# Patient Record
Sex: MALE | Race: BLACK OR AFRICAN AMERICAN | Employment: OTHER | URBAN - METROPOLITAN AREA
[De-identification: names, ages, dates, MRNs, and addresses within clinical notes are randomized per-mention and may not be internally consistent; named-entity substitution may affect disease eponyms.]

---

## 2023-07-07 ENCOUNTER — HOSPITAL ENCOUNTER (EMERGENCY)
Age: 63
Discharge: HOME | End: 2023-07-07
Payer: MEDICAID

## 2023-07-07 VITALS
HEART RATE: 97 BPM | TEMPERATURE: 97.1 F | WEIGHT: 265 LBS | HEIGHT: 73 IN | OXYGEN SATURATION: 99 % | SYSTOLIC BLOOD PRESSURE: 144 MMHG | BODY MASS INDEX: 35.12 KG/M2 | RESPIRATION RATE: 16 BRPM | DIASTOLIC BLOOD PRESSURE: 88 MMHG

## 2023-07-07 DIAGNOSIS — Z00.8 ENCOUNTER FOR MEDICAL CLEARANCE FOR PATIENT HOLD: Primary | ICD-10-CM

## 2023-07-07 PROCEDURE — 99282 EMERGENCY DEPT VISIT SF MDM: CPT

## 2023-07-07 ASSESSMENT — ENCOUNTER SYMPTOMS
DIARRHEA: 0
CHEST TIGHTNESS: 0
WHEEZING: 0
COUGH: 0
STRIDOR: 0
ABDOMINAL DISTENTION: 0
SHORTNESS OF BREATH: 0
VOMITING: 0
CHOKING: 0
RHINORRHEA: 0
ABDOMINAL PAIN: 0
NAUSEA: 0
APNEA: 0
SORE THROAT: 0

## 2023-07-07 ASSESSMENT — PAIN DESCRIPTION - ONSET: ONSET: ON-GOING

## 2023-07-07 ASSESSMENT — PAIN DESCRIPTION - PAIN TYPE: TYPE: ACUTE PAIN

## 2023-07-07 ASSESSMENT — PAIN - FUNCTIONAL ASSESSMENT: PAIN_FUNCTIONAL_ASSESSMENT: NONE - DENIES PAIN

## 2023-07-07 NOTE — ED TRIAGE NOTES
Pt states [facility] states he needs medical cleared to go into rehab. Pt is A&OX4, skin intact, afebrile, breaths are equal and unlabored.

## 2023-08-22 DIAGNOSIS — I50.9 CONGESTIVE HEART FAILURE, UNSPECIFIED HF CHRONICITY, UNSPECIFIED HEART FAILURE TYPE (HCC): Primary | ICD-10-CM

## 2023-08-25 ENCOUNTER — OFFICE VISIT (OUTPATIENT)
Dept: SURGERY | Age: 63
End: 2023-08-25

## 2023-08-25 VITALS
TEMPERATURE: 97.7 F | OXYGEN SATURATION: 97 % | HEART RATE: 70 BPM | WEIGHT: 261.4 LBS | SYSTOLIC BLOOD PRESSURE: 132 MMHG | HEIGHT: 73 IN | BODY MASS INDEX: 34.64 KG/M2 | DIASTOLIC BLOOD PRESSURE: 68 MMHG

## 2023-08-25 DIAGNOSIS — L73.2 HIDRADENITIS SUPPURATIVA OF ANUS: Primary | ICD-10-CM

## 2023-08-25 RX ORDER — MAGNESIUM OXIDE 400 MG/1
1 TABLET ORAL DAILY
COMMUNITY
Start: 2023-08-04

## 2023-08-25 RX ORDER — POTASSIUM CHLORIDE 1500 MG/1
20 TABLET, EXTENDED RELEASE ORAL DAILY
COMMUNITY
Start: 2023-08-04

## 2023-08-25 RX ORDER — MULTIVITAMIN
1 CAPSULE ORAL
COMMUNITY

## 2023-08-25 RX ORDER — SULFAMETHOXAZOLE AND TRIMETHOPRIM 800; 160 MG/1; MG/1
1 TABLET ORAL EVERY 12 HOURS
COMMUNITY
Start: 2023-08-17

## 2023-08-25 RX ORDER — SPIRONOLACTONE 25 MG/1
25 TABLET ORAL DAILY
COMMUNITY
Start: 2023-08-04

## 2023-08-25 RX ORDER — SACUBITRIL AND VALSARTAN 49; 51 MG/1; MG/1
TABLET, FILM COATED ORAL
COMMUNITY
Start: 2023-08-25

## 2023-08-25 RX ORDER — PSEUDOEPHEDRINE HCL 30 MG
100 TABLET ORAL
COMMUNITY
Start: 2019-08-06

## 2023-08-25 RX ORDER — METOPROLOL SUCCINATE 50 MG/1
50 TABLET, EXTENDED RELEASE ORAL DAILY
COMMUNITY
Start: 2023-08-04

## 2023-08-25 RX ORDER — BUMETANIDE 2 MG/1
2 TABLET ORAL 2 TIMES DAILY
COMMUNITY
Start: 2023-08-04

## 2023-08-25 RX ORDER — IBUPROFEN 600 MG/1
600 TABLET ORAL
COMMUNITY
Start: 2019-07-09

## 2023-08-31 ENCOUNTER — OFFICE VISIT (OUTPATIENT)
Dept: CARDIOLOGY CLINIC | Age: 63
End: 2023-08-31
Payer: MEDICAID

## 2023-08-31 VITALS
HEART RATE: 75 BPM | BODY MASS INDEX: 34.27 KG/M2 | WEIGHT: 258.6 LBS | OXYGEN SATURATION: 98 % | HEIGHT: 73 IN | SYSTOLIC BLOOD PRESSURE: 128 MMHG | DIASTOLIC BLOOD PRESSURE: 72 MMHG

## 2023-08-31 DIAGNOSIS — I50.43 CHF (CONGESTIVE HEART FAILURE), NYHA CLASS I, ACUTE ON CHRONIC, COMBINED (HCC): ICD-10-CM

## 2023-08-31 DIAGNOSIS — I10 HYPERTENSION, UNSPECIFIED TYPE: ICD-10-CM

## 2023-08-31 DIAGNOSIS — Z00.00 PE (PHYSICAL EXAM), ANNUAL: Primary | ICD-10-CM

## 2023-08-31 PROCEDURE — 99204 OFFICE O/P NEW MOD 45 MIN: CPT | Performed by: INTERNAL MEDICINE

## 2023-08-31 PROCEDURE — 3078F DIAST BP <80 MM HG: CPT | Performed by: INTERNAL MEDICINE

## 2023-08-31 PROCEDURE — 93000 ELECTROCARDIOGRAM COMPLETE: CPT | Performed by: INTERNAL MEDICINE

## 2023-08-31 PROCEDURE — 3074F SYST BP LT 130 MM HG: CPT | Performed by: INTERNAL MEDICINE

## 2023-08-31 ASSESSMENT — ENCOUNTER SYMPTOMS
GASTROINTESTINAL NEGATIVE: 1
CHEST TIGHTNESS: 0
EYES NEGATIVE: 1
WHEEZING: 0
SHORTNESS OF BREATH: 0
RESPIRATORY NEGATIVE: 1
COUGH: 0
BLOOD IN STOOL: 0
NAUSEA: 0
STRIDOR: 0

## 2023-08-31 NOTE — PROGRESS NOTES
NEW PATIENT        Patient: Molly Avila  YOB: 1960  MRN: [id number]    Chief Complaint:  CHF  Chief Complaint   Patient presents with    New Patient     Referred by Mikey Villaseñor CNP for CHF       CV Data:  11/22 Echo - [facility] LV Dilated REF 10-15    Subjective/HPI  referred for CHF. Pt moved from [place], [place]. Pt apparently has reduced LVEF and has had CHF. He is on appropriate HF meds at this time he is compensated with no sob nor cp. 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 No past medical history on file. No past surgical history on file. No family history on file. Social History     Socioeconomic History    Marital status:      Spouse name: None    Number of children: None    Years of education: None    Highest education level: None   Tobacco Use    Smoking status: Every Day     Packs/day: 0.50     Types: Cigarettes    Smokeless tobacco: Never   Substance and Sexual Activity    Alcohol use: Not Currently    Drug use: Never       Allergies   Allergen Reactions    Monosodium Glutamate Other (See Comments)     Pt experiences boils. boils and skin irritations         Current Outpatient Medications   Medication Sig Dispense Refill    sulfamethoxazole-trimethoprim (BACTRIM DS;SEPTRA DS) 800-160 MG per tablet Take 1 tablet by mouth in the morning and 1 tablet in the evening.       bumetanide (BUMEX) 2 MG tablet Take 1 tablet by mouth 2 times daily      ibuprofen (ADVIL;MOTRIN) 600 MG tablet Take 1 tablet by mouth      magnesium oxide (MAG-OX) 400 MG tablet Take 1 tablet by mouth daily      metoprolol succinate (TOPROL XL) 50 MG extended release tablet Take 1 tablet by mouth daily      potassium chloride (KLOR-CON M) 20 MEQ TBCR extended release tablet Take 1 tablet by mouth daily      sacubitril-valsartan (ENTRESTO) 49-51 MG per tablet       spironolactone (ALDACTONE) 25 MG tablet Take 1 tablet by mouth

## 2023-11-16 ENCOUNTER — HOSPITAL ENCOUNTER (OUTPATIENT)
Age: 63
Discharge: HOME | End: 2023-11-18
Attending: INTERNAL MEDICINE
Payer: COMMERCIAL

## 2023-11-16 VITALS
BODY MASS INDEX: 34.27 KG/M2 | WEIGHT: 258.6 LBS | HEIGHT: 73 IN | DIASTOLIC BLOOD PRESSURE: 72 MMHG | SYSTOLIC BLOOD PRESSURE: 128 MMHG

## 2023-11-16 DIAGNOSIS — I50.43 CHF (CONGESTIVE HEART FAILURE), NYHA CLASS I, ACUTE ON CHRONIC, COMBINED (HCC): ICD-10-CM

## 2023-11-16 LAB
ECHO AO ROOT DIAM: 3.3 CM
ECHO AO ROOT INDEX: 1.38 CM/M2
ECHO AR MAX VEL PISA: 3.9 M/S
ECHO AV AREA PEAK VELOCITY: 1.9 CM2
ECHO AV AREA VTI: 2 CM2
ECHO AV AREA/BSA PEAK VELOCITY: 0.8 CM2/M2
ECHO AV AREA/BSA VTI: 0.8 CM2/M2
ECHO AV MEAN GRADIENT: 6 MMHG
ECHO AV MEAN VELOCITY: 1.2 M/S
ECHO AV PEAK GRADIENT: 12 MMHG
ECHO AV PEAK VELOCITY: 1.8 M/S
ECHO AV REGURGITANT PHT: 372.8 MILLISECOND
ECHO AV VELOCITY RATIO: 0.61
ECHO AV VTI: 38.1 CM
ECHO BSA: 2.46 M2
ECHO EST RA PRESSURE: 10 MMHG
ECHO LA VOL A-L A2C: 97 ML (ref 18–58)
ECHO LA VOL A-L A4C: 86 ML (ref 18–58)
ECHO LA VOL MOD A2C: 89 ML (ref 18–58)
ECHO LA VOL MOD A4C: 81 ML (ref 18–58)
ECHO LA VOLUME AREA LENGTH: 93 ML
ECHO LA VOLUME INDEX A-L A2C: 40 ML/M2 (ref 16–34)
ECHO LA VOLUME INDEX A-L A4C: 36 ML/M2 (ref 16–34)
ECHO LA VOLUME INDEX AREA LENGTH: 39 ML/M2 (ref 16–34)
ECHO LA VOLUME INDEX MOD A2C: 37 ML/M2 (ref 16–34)
ECHO LA VOLUME INDEX MOD A4C: 34 ML/M2 (ref 16–34)
ECHO LV E' LATERAL VELOCITY: 12 CM/S
ECHO LV E' SEPTAL VELOCITY: 7 CM/S
ECHO LV EDV A2C: 181 ML
ECHO LV EDV A4C: 205 ML
ECHO LV EDV BP: 167 ML (ref 67–155)
ECHO LV EDV INDEX A4C: 85 ML/M2
ECHO LV EDV INDEX BP: 70 ML/M2
ECHO LV EDV NDEX A2C: 75 ML/M2
ECHO LV EJECTION FRACTION A2C: 49 %
ECHO LV EJECTION FRACTION A4C: 36 %
ECHO LV EJECTION FRACTION BIPLANE: 36 % (ref 55–100)
ECHO LV ESV A2C: 93 ML
ECHO LV ESV A4C: 132 ML
ECHO LV ESV BP: 107 ML (ref 22–58)
ECHO LV ESV INDEX A2C: 39 ML/M2
ECHO LV ESV INDEX A4C: 55 ML/M2
ECHO LV ESV INDEX BP: 45 ML/M2
ECHO LVOT AREA: 3.1 CM2
ECHO LVOT AV VTI INDEX: 0.65
ECHO LVOT DIAM: 2 CM
ECHO LVOT MEAN GRADIENT: 3 MMHG
ECHO LVOT PEAK GRADIENT: 4 MMHG
ECHO LVOT PEAK VELOCITY: 1.1 M/S
ECHO LVOT STROKE VOLUME INDEX: 32.6 ML/M2
ECHO LVOT SV: 78.2 ML
ECHO LVOT VTI: 24.9 CM
ECHO MV A VELOCITY: 0.49 M/S
ECHO MV AREA VTI: 2.3 CM2
ECHO MV E DECELERATION TIME (DT): 184.7 MS
ECHO MV E VELOCITY: 0.72 M/S
ECHO MV E/A RATIO: 1.47
ECHO MV E/E' LATERAL: 6
ECHO MV E/E' RATIO (AVERAGED): 8.14
ECHO MV LVOT VTI INDEX: 1.38
ECHO MV MAX VELOCITY: 1.1 M/S
ECHO MV MEAN GRADIENT: 2 MMHG
ECHO MV MEAN VELOCITY: 0.7 M/S
ECHO MV PEAK GRADIENT: 4 MMHG
ECHO MV REGURGITANT PEAK GRADIENT: 100 MMHG
ECHO MV REGURGITANT PEAK VELOCITY: 5 M/S
ECHO MV VTI: 34.3 CM
ECHO RIGHT VENTRICULAR SYSTOLIC PRESSURE (RVSP): 74 MMHG
ECHO TV REGURGITANT MAX VELOCITY: 4.01 M/S
ECHO TV REGURGITANT PEAK GRADIENT: 64 MMHG

## 2023-11-16 PROCEDURE — 6360000004 HC RX CONTRAST MEDICATION: Performed by: INTERNAL MEDICINE

## 2023-11-16 PROCEDURE — C8929 TTE W OR WO FOL WCON,DOPPLER: HCPCS

## 2023-11-16 RX ADMIN — PERFLUTREN 1.5 ML: 6.52 INJECTION, SUSPENSION INTRAVENOUS at 15:15

## 2023-11-22 ENCOUNTER — PREP FOR PROCEDURE (OUTPATIENT)
Dept: CARDIOLOGY CLINIC | Age: 63
End: 2023-11-22

## 2023-11-22 ENCOUNTER — OFFICE VISIT (OUTPATIENT)
Dept: CARDIOLOGY CLINIC | Age: 63
End: 2023-11-22
Payer: MEDICAID

## 2023-11-22 VITALS
DIASTOLIC BLOOD PRESSURE: 70 MMHG | WEIGHT: 266 LBS | HEART RATE: 72 BPM | SYSTOLIC BLOOD PRESSURE: 128 MMHG | OXYGEN SATURATION: 97 % | BODY MASS INDEX: 35.25 KG/M2 | HEIGHT: 73 IN

## 2023-11-22 DIAGNOSIS — R06.09 DOE (DYSPNEA ON EXERTION): ICD-10-CM

## 2023-11-22 DIAGNOSIS — I42.9 CARDIOMYOPATHY, UNSPECIFIED TYPE (HCC): Primary | ICD-10-CM

## 2023-11-22 DIAGNOSIS — I35.1 NONRHEUMATIC AORTIC VALVE INSUFFICIENCY: ICD-10-CM

## 2023-11-22 DIAGNOSIS — I10 HYPERTENSION, UNSPECIFIED TYPE: ICD-10-CM

## 2023-11-22 LAB
ANION GAP SERPL CALCULATED.3IONS-SCNC: 11 MEQ/L (ref 9–15)
BUN SERPL-MCNC: 12 MG/DL (ref 8–23)
CALCIUM SERPL-MCNC: 9.3 MG/DL (ref 8.5–9.9)
CHLORIDE SERPL-SCNC: 106 MEQ/L (ref 95–107)
CO2 SERPL-SCNC: 24 MEQ/L (ref 20–31)
CREAT SERPL-MCNC: 0.87 MG/DL (ref 0.7–1.2)
ERYTHROCYTE [DISTWIDTH] IN BLOOD BY AUTOMATED COUNT: 13.7 % (ref 11.5–14.5)
GLUCOSE SERPL-MCNC: 117 MG/DL (ref 70–99)
HCT VFR BLD AUTO: 44.8 % (ref 42–52)
HGB BLD-MCNC: 14.1 G/DL (ref 14–18)
MCH RBC QN AUTO: 26.8 PG (ref 27–31.3)
MCHC RBC AUTO-ENTMCNC: 31.5 % (ref 33–37)
MCV RBC AUTO: 85 FL (ref 79–92.2)
PLATELET # BLD AUTO: 246 K/UL (ref 130–400)
POTASSIUM SERPL-SCNC: 3.7 MEQ/L (ref 3.4–4.9)
RBC # BLD AUTO: 5.27 M/UL (ref 4.7–6.1)
SODIUM SERPL-SCNC: 141 MEQ/L (ref 135–144)
WBC # BLD AUTO: 6.6 K/UL (ref 4.8–10.8)

## 2023-11-22 PROCEDURE — 4004F PT TOBACCO SCREEN RCVD TLK: CPT | Performed by: INTERNAL MEDICINE

## 2023-11-22 PROCEDURE — 3017F COLORECTAL CA SCREEN DOC REV: CPT | Performed by: INTERNAL MEDICINE

## 2023-11-22 PROCEDURE — G8427 DOCREV CUR MEDS BY ELIG CLIN: HCPCS | Performed by: INTERNAL MEDICINE

## 2023-11-22 PROCEDURE — G8484 FLU IMMUNIZE NO ADMIN: HCPCS | Performed by: INTERNAL MEDICINE

## 2023-11-22 PROCEDURE — 3078F DIAST BP <80 MM HG: CPT | Performed by: INTERNAL MEDICINE

## 2023-11-22 PROCEDURE — G8417 CALC BMI ABV UP PARAM F/U: HCPCS | Performed by: INTERNAL MEDICINE

## 2023-11-22 PROCEDURE — 99215 OFFICE O/P EST HI 40 MIN: CPT | Performed by: INTERNAL MEDICINE

## 2023-11-22 PROCEDURE — 3074F SYST BP LT 130 MM HG: CPT | Performed by: INTERNAL MEDICINE

## 2023-11-22 RX ORDER — SODIUM CHLORIDE 9 MG/ML
INJECTION, SOLUTION INTRAVENOUS PRN
Status: CANCELLED | OUTPATIENT
Start: 2023-11-22

## 2023-11-22 RX ORDER — ONDANSETRON 2 MG/ML
4 INJECTION INTRAMUSCULAR; INTRAVENOUS EVERY 6 HOURS PRN
Status: CANCELLED | OUTPATIENT
Start: 2023-11-22

## 2023-11-22 RX ORDER — NITROGLYCERIN 0.4 MG/1
0.4 TABLET SUBLINGUAL EVERY 5 MIN PRN
Status: CANCELLED | OUTPATIENT
Start: 2023-11-22

## 2023-11-22 RX ORDER — SODIUM CHLORIDE 450 MG/100ML
75 INJECTION, SOLUTION INTRAVENOUS CONTINUOUS
Status: CANCELLED | OUTPATIENT
Start: 2023-11-22

## 2023-11-22 RX ORDER — SODIUM CHLORIDE 0.9 % (FLUSH) 0.9 %
5-40 SYRINGE (ML) INJECTION PRN
Status: CANCELLED | OUTPATIENT
Start: 2023-11-22

## 2023-11-22 RX ORDER — DIPHENHYDRAMINE HYDROCHLORIDE 50 MG/ML
50 INJECTION INTRAMUSCULAR; INTRAVENOUS ONCE
Status: CANCELLED | OUTPATIENT
Start: 2023-11-22 | End: 2023-11-22

## 2023-11-22 RX ORDER — SODIUM CHLORIDE 0.9 % (FLUSH) 0.9 %
5-40 SYRINGE (ML) INJECTION EVERY 12 HOURS SCHEDULED
Status: CANCELLED | OUTPATIENT
Start: 2023-11-22

## 2023-11-22 ASSESSMENT — ENCOUNTER SYMPTOMS
SHORTNESS OF BREATH: 0
GASTROINTESTINAL NEGATIVE: 1
BLOOD IN STOOL: 0
WHEEZING: 0
CHEST TIGHTNESS: 0
COUGH: 0
STRIDOR: 0
EYES NEGATIVE: 1
RESPIRATORY NEGATIVE: 1
NAUSEA: 0

## 2023-11-22 NOTE — PROGRESS NOTES
OFFICE VISIT         Patient: Douglas Osullivan  YOB: 1960  MRN: [id number]    Chief Complaint:  CHF  Chief Complaint   Patient presents with    Results     ECHO       CV Data:  11/22 Echo - [facility] LV Dilated REF 10-15  11/23 Echo 25-30 RVSP 74 AR mild Mod     Subjective/HPI  referred for CHF. Pt moved from [place], [place]. Pt apparently has reduced LVEF and has had CHF. He is on appropriate HF meds at this time he is compensated with no sob nor cp. No edema     No cath    11/22/23 some estrada no cp no falls no bleed takes meds. 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 No past medical history on file. Past Surgical History:   Procedure Laterality Date    XR MIDLINE EQUAL OR GREATER THAN 5 YEARS  6/6/2014    XR MIDLINE EQUAL OR GREATER THAN 5 YEARS 6/6/2014    XR MIDLINE EQUAL OR GREATER THAN 5 YEARS  8/27/2013    XR MIDLINE EQUAL OR GREATER THAN 5 YEARS 8/27/2013       No family history on file. Social History     Socioeconomic History    Marital status:      Spouse name: None    Number of children: None    Years of education: None    Highest education level: None   Tobacco Use    Smoking status: Every Day     Packs/day: .5     Types: Cigarettes    Smokeless tobacco: Never   Substance and Sexual Activity    Alcohol use: Not Currently    Drug use: Never       Allergies   Allergen Reactions    Monosodium Glutamate Other (See Comments)     Pt experiences boils. boils and skin irritations         Current Outpatient Medications   Medication Sig Dispense Refill    sulfamethoxazole-trimethoprim (BACTRIM DS;SEPTRA DS) 800-160 MG per tablet Take 1 tablet by mouth in the morning and 1 tablet in the evening.       bumetanide (BUMEX) 2 MG tablet Take 1 tablet by mouth 2 times daily      ibuprofen (ADVIL;MOTRIN) 600 MG tablet Take 1 tablet by mouth      magnesium oxide (MAG-OX) 400 MG tablet Take 1 tablet by mouth daily      metoprolol succinate

## 2023-12-01 DIAGNOSIS — I42.9 CARDIOMYOPATHY, UNSPECIFIED TYPE (HCC): Primary | ICD-10-CM

## 2023-12-08 ENCOUNTER — HOSPITAL ENCOUNTER (OUTPATIENT)
Age: 63
Setting detail: OUTPATIENT SURGERY
Discharge: HOME | End: 2023-12-08
Attending: INTERNAL MEDICINE | Admitting: INTERNAL MEDICINE
Payer: COMMERCIAL

## 2023-12-08 ENCOUNTER — TELEPHONE (OUTPATIENT)
Dept: CARDIOLOGY CLINIC | Age: 63
End: 2023-12-08

## 2023-12-08 VITALS
RESPIRATION RATE: 28 BRPM | BODY MASS INDEX: 34.3 KG/M2 | WEIGHT: 260 LBS | OXYGEN SATURATION: 93 % | TEMPERATURE: 97.1 F | HEART RATE: 77 BPM | DIASTOLIC BLOOD PRESSURE: 77 MMHG | SYSTOLIC BLOOD PRESSURE: 127 MMHG

## 2023-12-08 DIAGNOSIS — I42.9 CARDIOMYOPATHY (HCC): ICD-10-CM

## 2023-12-08 PROCEDURE — 93458 L HRT ARTERY/VENTRICLE ANGIO: CPT | Performed by: INTERNAL MEDICINE

## 2023-12-08 PROCEDURE — C1887 CATHETER, GUIDING: HCPCS | Performed by: INTERNAL MEDICINE

## 2023-12-08 PROCEDURE — C1725 CATH, TRANSLUMIN NON-LASER: HCPCS | Performed by: INTERNAL MEDICINE

## 2023-12-08 PROCEDURE — 99153 MOD SED SAME PHYS/QHP EA: CPT | Performed by: INTERNAL MEDICINE

## 2023-12-08 PROCEDURE — 93571 IV DOP VEL&/PRESS C FLO 1ST: CPT | Performed by: INTERNAL MEDICINE

## 2023-12-08 PROCEDURE — 2500000003 HC RX 250 WO HCPCS: Performed by: INTERNAL MEDICINE

## 2023-12-08 PROCEDURE — 2580000003 HC RX 258: Performed by: INTERNAL MEDICINE

## 2023-12-08 PROCEDURE — 85347 COAGULATION TIME ACTIVATED: CPT

## 2023-12-08 PROCEDURE — 6360000004 HC RX CONTRAST MEDICATION: Performed by: INTERNAL MEDICINE

## 2023-12-08 PROCEDURE — 99152 MOD SED SAME PHYS/QHP 5/>YRS: CPT | Performed by: INTERNAL MEDICINE

## 2023-12-08 PROCEDURE — C1874 STENT, COATED/COV W/DEL SYS: HCPCS | Performed by: INTERNAL MEDICINE

## 2023-12-08 PROCEDURE — C1769 GUIDE WIRE: HCPCS | Performed by: INTERNAL MEDICINE

## 2023-12-08 PROCEDURE — C1894 INTRO/SHEATH, NON-LASER: HCPCS | Performed by: INTERNAL MEDICINE

## 2023-12-08 PROCEDURE — 7100000011 HC PHASE II RECOVERY - ADDTL 15 MIN: Performed by: INTERNAL MEDICINE

## 2023-12-08 PROCEDURE — 6370000000 HC RX 637 (ALT 250 FOR IP): Performed by: INTERNAL MEDICINE

## 2023-12-08 PROCEDURE — 6360000002 HC RX W HCPCS: Performed by: INTERNAL MEDICINE

## 2023-12-08 PROCEDURE — 2709999900 HC NON-CHARGEABLE SUPPLY: Performed by: INTERNAL MEDICINE

## 2023-12-08 PROCEDURE — C9600 PERC DRUG-EL COR STENT SING: HCPCS | Performed by: INTERNAL MEDICINE

## 2023-12-08 PROCEDURE — 7100000010 HC PHASE II RECOVERY - FIRST 15 MIN: Performed by: INTERNAL MEDICINE

## 2023-12-08 DEVICE — STENT ONYXNG30022UX ONYX 3.00X22RX
Type: IMPLANTABLE DEVICE | Status: FUNCTIONAL
Brand: ONYX FRONTIER™

## 2023-12-08 RX ORDER — ONDANSETRON 2 MG/ML
4 INJECTION INTRAMUSCULAR; INTRAVENOUS EVERY 6 HOURS PRN
Status: DISCONTINUED | OUTPATIENT
Start: 2023-12-08 | End: 2023-12-08 | Stop reason: HOSPADM

## 2023-12-08 RX ORDER — HEPARIN SODIUM 200 [USP'U]/100ML
INJECTION, SOLUTION INTRAVENOUS CONTINUOUS PRN
Status: COMPLETED | OUTPATIENT
Start: 2023-12-08 | End: 2023-12-08

## 2023-12-08 RX ORDER — FENTANYL CITRATE 50 UG/ML
INJECTION, SOLUTION INTRAMUSCULAR; INTRAVENOUS PRN
Status: DISCONTINUED | OUTPATIENT
Start: 2023-12-08 | End: 2023-12-08 | Stop reason: HOSPADM

## 2023-12-08 RX ORDER — SODIUM CHLORIDE 0.9 % (FLUSH) 0.9 %
5-40 SYRINGE (ML) INJECTION PRN
Status: DISCONTINUED | OUTPATIENT
Start: 2023-12-08 | End: 2023-12-08 | Stop reason: HOSPADM

## 2023-12-08 RX ORDER — ATORVASTATIN CALCIUM 80 MG/1
80 TABLET, FILM COATED ORAL DAILY
Qty: 90 TABLET | Refills: 3 | Status: SHIPPED | OUTPATIENT
Start: 2023-12-08

## 2023-12-08 RX ORDER — SODIUM CHLORIDE 9 MG/ML
INJECTION, SOLUTION INTRAVENOUS PRN
Status: DISCONTINUED | OUTPATIENT
Start: 2023-12-08 | End: 2023-12-08 | Stop reason: HOSPADM

## 2023-12-08 RX ORDER — SODIUM CHLORIDE 450 MG/100ML
75 INJECTION, SOLUTION INTRAVENOUS CONTINUOUS
Status: DISCONTINUED | OUTPATIENT
Start: 2023-12-08 | End: 2023-12-08 | Stop reason: HOSPADM

## 2023-12-08 RX ORDER — MIDAZOLAM HYDROCHLORIDE 1 MG/ML
INJECTION INTRAMUSCULAR; INTRAVENOUS PRN
Status: DISCONTINUED | OUTPATIENT
Start: 2023-12-08 | End: 2023-12-08 | Stop reason: HOSPADM

## 2023-12-08 RX ORDER — LIDOCAINE HYDROCHLORIDE 10 MG/ML
INJECTION, SOLUTION INFILTRATION; PERINEURAL PRN
Status: DISCONTINUED | OUTPATIENT
Start: 2023-12-08 | End: 2023-12-08 | Stop reason: HOSPADM

## 2023-12-08 RX ORDER — NITROGLYCERIN 20 MG/100ML
INJECTION INTRAVENOUS CONTINUOUS PRN
Status: COMPLETED | OUTPATIENT
Start: 2023-12-08 | End: 2023-12-08

## 2023-12-08 RX ORDER — SODIUM CHLORIDE 0.9 % (FLUSH) 0.9 %
5-40 SYRINGE (ML) INJECTION EVERY 12 HOURS SCHEDULED
Status: DISCONTINUED | OUTPATIENT
Start: 2023-12-08 | End: 2023-12-08 | Stop reason: HOSPADM

## 2023-12-08 RX ORDER — DIPHENHYDRAMINE HYDROCHLORIDE 50 MG/ML
50 INJECTION INTRAMUSCULAR; INTRAVENOUS ONCE
Status: DISCONTINUED | OUTPATIENT
Start: 2023-12-08 | End: 2023-12-08 | Stop reason: HOSPADM

## 2023-12-08 RX ORDER — CLOPIDOGREL 300 MG/1
600 TABLET, FILM COATED ORAL ONCE
Status: COMPLETED | OUTPATIENT
Start: 2023-12-08 | End: 2023-12-08

## 2023-12-08 RX ORDER — SODIUM CHLORIDE 9 MG/ML
INJECTION, SOLUTION INTRAVENOUS CONTINUOUS
Status: DISCONTINUED | OUTPATIENT
Start: 2023-12-08 | End: 2023-12-08 | Stop reason: HOSPADM

## 2023-12-08 RX ORDER — ASPIRIN 81 MG/1
81 TABLET ORAL DAILY
Qty: 90 TABLET | Refills: 1 | Status: SHIPPED | OUTPATIENT
Start: 2023-12-08

## 2023-12-08 RX ORDER — MIDAZOLAM HYDROCHLORIDE 2 MG/2ML
2 INJECTION, SOLUTION INTRAMUSCULAR; INTRAVENOUS
Status: DISCONTINUED | OUTPATIENT
Start: 2023-12-08 | End: 2023-12-08 | Stop reason: HOSPADM

## 2023-12-08 RX ORDER — CLOPIDOGREL BISULFATE 75 MG/1
75 TABLET ORAL DAILY
Qty: 30 TABLET | Refills: 3 | Status: SHIPPED | OUTPATIENT
Start: 2023-12-08

## 2023-12-08 RX ORDER — NITROGLYCERIN 0.4 MG/1
0.4 TABLET SUBLINGUAL EVERY 5 MIN PRN
Status: DISCONTINUED | OUTPATIENT
Start: 2023-12-08 | End: 2023-12-08 | Stop reason: HOSPADM

## 2023-12-08 RX ORDER — ACETAMINOPHEN 325 MG/1
650 TABLET ORAL EVERY 4 HOURS PRN
Status: DISCONTINUED | OUTPATIENT
Start: 2023-12-08 | End: 2023-12-08 | Stop reason: HOSPADM

## 2023-12-08 RX ORDER — FENTANYL CITRATE 0.05 MG/ML
25 INJECTION, SOLUTION INTRAMUSCULAR; INTRAVENOUS
Status: DISCONTINUED | OUTPATIENT
Start: 2023-12-08 | End: 2023-12-08 | Stop reason: HOSPADM

## 2023-12-08 RX ORDER — LABETALOL HYDROCHLORIDE 5 MG/ML
10 INJECTION, SOLUTION INTRAVENOUS EVERY 30 MIN PRN
Status: DISCONTINUED | OUTPATIENT
Start: 2023-12-08 | End: 2023-12-08 | Stop reason: HOSPADM

## 2023-12-08 RX ORDER — NITROGLYCERIN 0.4 MG/1
0.4 TABLET SUBLINGUAL EVERY 5 MIN PRN
Qty: 25 TABLET | Refills: 3 | Status: SHIPPED | OUTPATIENT
Start: 2023-12-08

## 2023-12-08 RX ORDER — SODIUM CHLORIDE 450 MG/100ML
INJECTION, SOLUTION INTRAVENOUS CONTINUOUS
Status: DISCONTINUED | OUTPATIENT
Start: 2023-12-08 | End: 2023-12-08 | Stop reason: HOSPADM

## 2023-12-08 RX ORDER — ASPIRIN 325 MG
325 TABLET, DELAYED RELEASE (ENTERIC COATED) ORAL DAILY
Status: DISCONTINUED | OUTPATIENT
Start: 2023-12-08 | End: 2023-12-08 | Stop reason: HOSPADM

## 2023-12-08 RX ORDER — HYDRALAZINE HYDROCHLORIDE 20 MG/ML
10 INJECTION INTRAMUSCULAR; INTRAVENOUS EVERY 10 MIN PRN
Status: DISCONTINUED | OUTPATIENT
Start: 2023-12-08 | End: 2023-12-08 | Stop reason: HOSPADM

## 2023-12-08 RX ORDER — MORPHINE SULFATE 2 MG/ML
2 INJECTION, SOLUTION INTRAMUSCULAR; INTRAVENOUS
Status: DISCONTINUED | OUTPATIENT
Start: 2023-12-08 | End: 2023-12-08 | Stop reason: HOSPADM

## 2023-12-08 RX ORDER — HEPARIN SODIUM 1000 [USP'U]/ML
INJECTION, SOLUTION INTRAVENOUS; SUBCUTANEOUS PRN
Status: DISCONTINUED | OUTPATIENT
Start: 2023-12-08 | End: 2023-12-08 | Stop reason: HOSPADM

## 2023-12-08 RX ADMIN — CLOPIDOGREL BISULFATE 600 MG: 300 TABLET, FILM COATED ORAL at 10:17

## 2023-12-08 RX ADMIN — ASPIRIN 325 MG: 325 TABLET, COATED ORAL at 10:17

## 2023-12-08 NOTE — BRIEF OP NOTE
Section of Cardiology  Adult Brief Cardiac Cath Procedure Note        Procedure(s):    IFR of LAD (+0.76)  Successful PCI of the mid LAD KIMBERLEY x 1    Pre-operative Diagnosis:    Cardiomyopathy    H&P Status: Completed and reviewed.      Post-operative Diagnosis: Triple-vessel CAD    Findings:  See full report    Vitamin system  Left Main: Short mild disease  LAD: Mid segment 70% stenosis IFR positive status post successful PCI KIMBERLEY x 1  Circumflex: OM1 distal 80% stenosis (left untreated, will be staged)  RCA: Brick size dominant vessel proximal 80% stenosis mid 80% stenosis (left untreated will be staged)        Complications:  none    Recommendations  Plavix ideally for 1 year  Aspirin and atorvastatin indefinitely  Plan to bring patient back in 2 weeks for staged PCI of the RCA    Primary Proceduralist:   Chaya Alexis MD    Full procedure note to follow

## 2023-12-08 NOTE — TELEPHONE ENCOUNTER
----- Message from Yousuf Chavez MD sent at 12/8/2023  9:49 AM EST -----  Please arrange staged PCI of the RCA with me in 2 weeks either Tuesday or Thursday or Friday at 8am

## 2023-12-08 NOTE — PROGRESS NOTES
Arrived to pre/post from home. Name and date of birth verified along with allergies. Orders verified and consents obtained.   Oriented to surroundings

## 2023-12-08 NOTE — BRIEF OP NOTE
Brief Postoperative Note      Patient: Lenin Rowe  YOB: 1960  MRN: [id number]  Section of Cardiology  Adult Brief Cardiac Cath Procedure Note        Procedure(s):  LHC, b/l coronary angio via RRA    Pre-operative Diagnosis:  ALEJO and CMP    H&P Status: Completed and reviewed. Post-operative Diagnosis:  LVEF 30% EDP 14 LAD mid 70%.  CX OMB distal 80% RCA sequential Px and Mid 80%    Findings:  See full report    Complications:  none    Primary Proceduralist:   Yamilet Garduno MD

## 2023-12-08 NOTE — PROGRESS NOTES
Arrived to pre/post from the cath lab and report from [facility]. Right radial vasc band to right wrist with no bleeding or hematoma. Attached to monitor and vitals are stable. Taking food and fluids. No complaints at this time.   Will continue to monitor

## 2023-12-08 NOTE — PROGRESS NOTES
5 cc's of air removed from right radial vasc band intact with no bleeding or hematoma.  Ambulated to the restroom without difficulty

## 2023-12-08 NOTE — DISCHARGE INSTRUCTIONS
May shower and remove right wrist dressing in the morning  No driving for 24 hours  No heavy lifting anything over 5 pounds for 2 days  Any redness, drainage, increased swelling or fever please call Dr. Balaji Diamond office  Any bleeding apply pressure and if unable to control the bleeding call 911 or go to the nearest ER  Resume medications including new prescriptions that were sent to your pharmacy  Follow up in 1 week with Dr. Annetta Bird, please call for appointment  with Dr. Annetta Bird as Dr. Miguel Burnett plans to bring you back in 2 weeks for staged stenting

## 2023-12-08 NOTE — PROGRESS NOTES
All remaining air removed from right radial and quikclot applied. No bleeding or hematoma. Discharge instructions given and patient verbalizes understanding.   IV's dc'd intact

## 2023-12-11 LAB
POC ACT LR: 308 SEC
POC ACT LR: 319 SEC

## 2023-12-13 ENCOUNTER — TELEPHONE (OUTPATIENT)
Dept: CARDIOLOGY CLINIC | Age: 63
End: 2023-12-13

## 2023-12-13 DIAGNOSIS — I42.9 CARDIOMYOPATHY, UNSPECIFIED TYPE (HCC): Primary | ICD-10-CM

## 2023-12-13 RX ORDER — SODIUM CHLORIDE 9 MG/ML
INJECTION, SOLUTION INTRAVENOUS PRN
Status: CANCELLED | OUTPATIENT
Start: 2023-12-13

## 2023-12-13 RX ORDER — SODIUM CHLORIDE 0.9 % (FLUSH) 0.9 %
5-40 SYRINGE (ML) INJECTION PRN
Status: CANCELLED | OUTPATIENT
Start: 2023-12-13

## 2023-12-13 RX ORDER — SODIUM CHLORIDE 0.9 % (FLUSH) 0.9 %
5-40 SYRINGE (ML) INJECTION EVERY 12 HOURS SCHEDULED
Status: CANCELLED | OUTPATIENT
Start: 2023-12-13

## 2023-12-29 ENCOUNTER — APPOINTMENT (OUTPATIENT)
Dept: GENERAL RADIOLOGY | Age: 63
End: 2023-12-29
Payer: COMMERCIAL

## 2023-12-29 ENCOUNTER — APPOINTMENT (OUTPATIENT)
Dept: CT IMAGING | Age: 63
End: 2023-12-29
Payer: COMMERCIAL

## 2023-12-29 ENCOUNTER — HOSPITAL ENCOUNTER (EMERGENCY)
Age: 63
Discharge: HOME | End: 2023-12-29
Attending: EMERGENCY MEDICINE
Payer: COMMERCIAL

## 2023-12-29 VITALS
HEART RATE: 69 BPM | BODY MASS INDEX: 34.46 KG/M2 | RESPIRATION RATE: 18 BRPM | WEIGHT: 260 LBS | DIASTOLIC BLOOD PRESSURE: 66 MMHG | HEIGHT: 73 IN | SYSTOLIC BLOOD PRESSURE: 114 MMHG | OXYGEN SATURATION: 98 % | TEMPERATURE: 97.9 F

## 2023-12-29 DIAGNOSIS — R07.89 ATYPICAL CHEST PAIN: Primary | ICD-10-CM

## 2023-12-29 LAB
ALBUMIN SERPL-MCNC: 3.3 G/DL (ref 3.5–4.6)
ALP SERPL-CCNC: 119 U/L (ref 35–104)
ALT SERPL-CCNC: 27 U/L (ref 0–41)
ANION GAP SERPL CALCULATED.3IONS-SCNC: 12 MEQ/L (ref 9–15)
APTT PPP: 31.5 SEC (ref 24.4–36.8)
AST SERPL-CCNC: 23 U/L (ref 0–40)
BASOPHILS # BLD: 0.1 K/UL (ref 0–0.2)
BASOPHILS NFR BLD: 0.8 %
BILIRUB SERPL-MCNC: 0.4 MG/DL (ref 0.2–0.7)
BNP BLD-MCNC: 362 PG/ML
BUN SERPL-MCNC: 14 MG/DL (ref 8–23)
CALCIUM SERPL-MCNC: 9.1 MG/DL (ref 8.5–9.9)
CHLORIDE SERPL-SCNC: 101 MEQ/L (ref 95–107)
CK SERPL-CCNC: 85 U/L (ref 0–190)
CO2 SERPL-SCNC: 23 MEQ/L (ref 20–31)
CREAT SERPL-MCNC: 0.94 MG/DL (ref 0.7–1.2)
D DIMER PPP FEU-MCNC: 0.93 MG/L FEU (ref 0–0.5)
EKG ATRIAL RATE: 72 BPM
EKG P AXIS: 53 DEGREES
EKG P-R INTERVAL: 196 MS
EKG Q-T INTERVAL: 440 MS
EKG QRS DURATION: 106 MS
EKG QTC CALCULATION (BAZETT): 481 MS
EKG R AXIS: -29 DEGREES
EKG T AXIS: -5 DEGREES
EKG VENTRICULAR RATE: 72 BPM
EOSINOPHIL # BLD: 0.2 K/UL (ref 0–0.7)
EOSINOPHIL NFR BLD: 2.3 %
ERYTHROCYTE [DISTWIDTH] IN BLOOD BY AUTOMATED COUNT: 13.4 % (ref 11.5–14.5)
GLOBULIN SER CALC-MCNC: 4.5 G/DL (ref 2.3–3.5)
GLUCOSE SERPL-MCNC: 178 MG/DL (ref 70–99)
HCT VFR BLD AUTO: 48.6 % (ref 42–52)
HGB BLD-MCNC: 15.6 G/DL (ref 14–18)
INR PPP: 0.9
LYMPHOCYTES # BLD: 1.6 K/UL (ref 1–4.8)
LYMPHOCYTES NFR BLD: 19.2 %
MAGNESIUM SERPL-MCNC: 1.7 MG/DL (ref 1.7–2.4)
MCH RBC QN AUTO: 26.6 PG (ref 27–31.3)
MCHC RBC AUTO-ENTMCNC: 32.1 % (ref 33–37)
MCV RBC AUTO: 82.8 FL (ref 79–92.2)
MONOCYTES # BLD: 0.9 K/UL (ref 0.2–0.8)
MONOCYTES NFR BLD: 11 %
NEUTROPHILS # BLD: 5.6 K/UL (ref 1.4–6.5)
NEUTS SEG NFR BLD: 66.5 %
PLATELET # BLD AUTO: 273 K/UL (ref 130–400)
POTASSIUM SERPL-SCNC: 4.3 MEQ/L (ref 3.4–4.9)
PROT SERPL-MCNC: 7.8 G/DL (ref 6.3–8)
PROTHROMBIN TIME: 13.2 SEC (ref 12.3–14.9)
RBC # BLD AUTO: 5.87 M/UL (ref 4.7–6.1)
SODIUM SERPL-SCNC: 136 MEQ/L (ref 135–144)
TROPONIN, HIGH SENSITIVITY: 20 NG/L (ref 0–19)
TROPONIN, HIGH SENSITIVITY: 20 NG/L (ref 0–19)
TROPONIN, HIGH SENSITIVITY: 21 NG/L (ref 0–19)
WBC # BLD AUTO: 8.4 K/UL (ref 4.8–10.8)

## 2023-12-29 PROCEDURE — 6360000004 HC RX CONTRAST MEDICATION: Performed by: EMERGENCY MEDICINE

## 2023-12-29 PROCEDURE — 36415 COLL VENOUS BLD VENIPUNCTURE: CPT

## 2023-12-29 PROCEDURE — 93005 ELECTROCARDIOGRAM TRACING: CPT | Performed by: EMERGENCY MEDICINE

## 2023-12-29 PROCEDURE — 71045 X-RAY EXAM CHEST 1 VIEW: CPT

## 2023-12-29 PROCEDURE — 82550 ASSAY OF CK (CPK): CPT

## 2023-12-29 PROCEDURE — 96374 THER/PROPH/DIAG INJ IV PUSH: CPT

## 2023-12-29 PROCEDURE — 85730 THROMBOPLASTIN TIME PARTIAL: CPT

## 2023-12-29 PROCEDURE — 85025 COMPLETE CBC W/AUTO DIFF WBC: CPT

## 2023-12-29 PROCEDURE — 83880 ASSAY OF NATRIURETIC PEPTIDE: CPT

## 2023-12-29 PROCEDURE — 84484 ASSAY OF TROPONIN QUANT: CPT

## 2023-12-29 PROCEDURE — 99285 EMERGENCY DEPT VISIT HI MDM: CPT

## 2023-12-29 PROCEDURE — 71275 CT ANGIOGRAPHY CHEST: CPT

## 2023-12-29 PROCEDURE — 6370000000 HC RX 637 (ALT 250 FOR IP): Performed by: EMERGENCY MEDICINE

## 2023-12-29 PROCEDURE — 93010 ELECTROCARDIOGRAM REPORT: CPT | Performed by: INTERNAL MEDICINE

## 2023-12-29 PROCEDURE — 83735 ASSAY OF MAGNESIUM: CPT

## 2023-12-29 PROCEDURE — 85610 PROTHROMBIN TIME: CPT

## 2023-12-29 PROCEDURE — 6360000002 HC RX W HCPCS: Performed by: EMERGENCY MEDICINE

## 2023-12-29 PROCEDURE — 80053 COMPREHEN METABOLIC PANEL: CPT

## 2023-12-29 PROCEDURE — 85379 FIBRIN DEGRADATION QUANT: CPT

## 2023-12-29 RX ORDER — ONDANSETRON 2 MG/ML
4 INJECTION INTRAMUSCULAR; INTRAVENOUS ONCE
Status: COMPLETED | OUTPATIENT
Start: 2023-12-29 | End: 2023-12-29

## 2023-12-29 RX ORDER — ACETAMINOPHEN 500 MG
1000 TABLET ORAL ONCE
Status: COMPLETED | OUTPATIENT
Start: 2023-12-29 | End: 2023-12-29

## 2023-12-29 RX ADMIN — ACETAMINOPHEN 1000 MG: 500 TABLET ORAL at 10:49

## 2023-12-29 RX ADMIN — ONDANSETRON 4 MG: 2 INJECTION INTRAMUSCULAR; INTRAVENOUS at 08:45

## 2023-12-29 RX ADMIN — IOPAMIDOL 75 ML: 612 INJECTION, SOLUTION INTRAVENOUS at 08:37

## 2023-12-29 ASSESSMENT — PAIN - FUNCTIONAL ASSESSMENT: PAIN_FUNCTIONAL_ASSESSMENT: 0-10

## 2023-12-29 ASSESSMENT — PAIN DESCRIPTION - ORIENTATION: ORIENTATION: LEFT

## 2023-12-29 ASSESSMENT — PAIN DESCRIPTION - DESCRIPTORS
DESCRIPTORS: ACHING
DESCRIPTORS: THROBBING

## 2023-12-29 ASSESSMENT — PAIN SCALES - GENERAL
PAINLEVEL_OUTOF10: 7
PAINLEVEL_OUTOF10: 7

## 2023-12-29 ASSESSMENT — LIFESTYLE VARIABLES
HOW MANY STANDARD DRINKS CONTAINING ALCOHOL DO YOU HAVE ON A TYPICAL DAY: PATIENT DOES NOT DRINK
HOW OFTEN DO YOU HAVE A DRINK CONTAINING ALCOHOL: NEVER

## 2023-12-29 ASSESSMENT — PAIN DESCRIPTION - LOCATION
LOCATION: CHEST
LOCATION: CHEST

## 2023-12-29 NOTE — ED PROVIDER NOTES
[facility] ED  EMERGENCY DEPARTMENT ENCOUNTER      Pt Name: Jose Luis Hayes  MRN: [id number]  [address] 1960  Date of evaluation: 12/29/2023  Provider: Carley Rubin DO    CHIEF COMPLAINT       Chief Complaint   Patient presents with    Chest Pain         HISTORY OF PRESENT ILLNESS   (Location/Symptom, Timing/Onset, Context/Setting, Quality, Duration, Modifying Factors, Severity)  Note limiting factors. Jose Luis Hayes is a 59 y.o. male who presents to the emergency department . Patient presents with pain on the left side of his chest for 5 days. 8 days ago he had 2 stents placed in his heart. His cardiac cath was done because he had gone into congestive heart failure. He had not had any chest pain prior to the cardiac cath. Patient states that the chest pain started out mostly when he would lay down but now it is more constant. It is not causing him to be short of breath and it does not radiate. Patient is taking all of his medications. HPI    Nursing Notes were reviewed. REVIEW OF SYSTEMS    (2-9 systems for level 4, 10 or more for level 5)     Review of Systems   Constitutional:  Negative for activity change, appetite change and fatigue. HENT:  Negative for congestion and sore throat. Eyes:  Negative for pain and visual disturbance. Respiratory:  Negative for chest tightness and shortness of breath. Cardiovascular:  Positive for chest pain. Gastrointestinal:  Negative for abdominal pain, nausea and vomiting. Endocrine: Negative for polydipsia. Genitourinary:  Negative for flank pain and urgency. Musculoskeletal:  Negative for gait problem and neck stiffness. Skin:  Negative for rash. Neurological:  Negative for weakness, light-headedness and headaches. Psychiatric/Behavioral:  Negative for confusion and sleep disturbance. Except as noted above the remainder of the review of systems was reviewed and negative.        PAST MEDICAL HISTORY

## 2023-12-29 NOTE — ED TRIAGE NOTES
Pt reports cardiac catheterization with 2 stents placed on Thursday. Reports ongoing CP and SOB. Describes the pain as throbbing.

## 2024-01-08 ENCOUNTER — TELEPHONE (OUTPATIENT)
Dept: CARDIOLOGY CLINIC | Age: 64
End: 2024-01-08

## 2024-01-08 NOTE — TELEPHONE ENCOUNTER
----- Message from Wyatt Dawkins sent at 1/8/2024  9:09 AM EST -----  Regarding: Follow up appointment  Patient enrolled in cardiac rehab. Patient states he needs cardiology follow up appointment and has not been contacted to schedule. Patient recently had PCI and then had ER visit for chest pain. Electronically signed by Wyatt Dawkins on 1/8/2024 at 9:10 AM

## 2024-01-12 ENCOUNTER — TELEPHONE (OUTPATIENT)
Dept: CARDIOLOGY CLINIC | Age: 64
End: 2024-01-12

## 2024-01-12 DIAGNOSIS — Z95.5 STATUS POST CORONARY ARTERY STENT PLACEMENT: Primary | ICD-10-CM

## 2024-01-12 NOTE — TELEPHONE ENCOUNTER
----- Message from Wyatt Dawkins sent at 1/11/2024  4:12 PM EST -----  Regarding: CR referral  Good Morning,   This patient needs a referral to outpatient Cardiac rehab. This patient sees Dr. Meredith, and has DX Z95.5. Please review and provide referral. Thank you.    Wyatt Dawkins M.S. Ed, CCRP  Cardiac Rehabilitation Coordinator

## 2024-01-29 ENCOUNTER — OFFICE VISIT (OUTPATIENT)
Dept: CARDIOLOGY CLINIC | Age: 64
End: 2024-01-29
Payer: COMMERCIAL

## 2024-01-29 VITALS
SYSTOLIC BLOOD PRESSURE: 128 MMHG | WEIGHT: 263.2 LBS | HEIGHT: 73 IN | OXYGEN SATURATION: 97 % | HEART RATE: 79 BPM | DIASTOLIC BLOOD PRESSURE: 66 MMHG | BODY MASS INDEX: 34.88 KG/M2

## 2024-01-29 DIAGNOSIS — I10 HYPERTENSION, UNSPECIFIED TYPE: ICD-10-CM

## 2024-01-29 DIAGNOSIS — E78.5 DYSLIPIDEMIA: ICD-10-CM

## 2024-01-29 DIAGNOSIS — Z95.5 STATUS POST CORONARY ARTERY STENT PLACEMENT: Primary | ICD-10-CM

## 2024-01-29 DIAGNOSIS — I25.5 ISCHEMIC CARDIOMYOPATHY: ICD-10-CM

## 2024-01-29 DIAGNOSIS — I50.43 CHF (CONGESTIVE HEART FAILURE), NYHA CLASS I, ACUTE ON CHRONIC, COMBINED (HCC): ICD-10-CM

## 2024-01-29 DIAGNOSIS — I35.1 NONRHEUMATIC AORTIC VALVE INSUFFICIENCY: ICD-10-CM

## 2024-01-29 PROCEDURE — 3074F SYST BP LT 130 MM HG: CPT | Performed by: INTERNAL MEDICINE

## 2024-01-29 PROCEDURE — 99214 OFFICE O/P EST MOD 30 MIN: CPT | Performed by: INTERNAL MEDICINE

## 2024-01-29 PROCEDURE — 3078F DIAST BP <80 MM HG: CPT | Performed by: INTERNAL MEDICINE

## 2024-01-29 RX ORDER — BUMETANIDE 2 MG/1
2 TABLET ORAL DAILY
Qty: 90 TABLET | Refills: 3 | Status: SHIPPED | OUTPATIENT
Start: 2024-01-29

## 2024-01-29 RX ORDER — SPIRONOLACTONE 25 MG/1
25 TABLET ORAL DAILY
Qty: 90 TABLET | Refills: 3 | Status: SHIPPED | OUTPATIENT
Start: 2024-01-29

## 2024-01-29 ASSESSMENT — ENCOUNTER SYMPTOMS
EYES NEGATIVE: 1
CHEST TIGHTNESS: 0
GASTROINTESTINAL NEGATIVE: 1
RESPIRATORY NEGATIVE: 1
STRIDOR: 0
NAUSEA: 0
SHORTNESS OF BREATH: 0
WHEEZING: 0
COUGH: 0
BLOOD IN STOOL: 0

## 2024-01-29 NOTE — PROGRESS NOTES
assess.     LV remains poor- RBA Cath PCI discussed.     3. Hypertension, unspecified type   Stable low salt diet.      Start CR  Counseling:  Heart Healthy Lifestyle, Low Salt Diet, Take Precautions to Prevent Falls, and Walk Daily    Return in about 4 months (around 5/29/2024).      Electronically signed by DAV MOROCHO MD on 1/29/2024 at 1:40 PM

## 2024-01-30 ENCOUNTER — HOSPITAL ENCOUNTER (OUTPATIENT)
Dept: CARDIAC REHAB | Age: 64
Setting detail: THERAPIES SERIES
Discharge: HOME | End: 2024-01-30
Payer: COMMERCIAL

## 2024-01-30 VITALS — BODY MASS INDEX: 34.99 KG/M2 | RESPIRATION RATE: 18 BRPM | OXYGEN SATURATION: 93 % | WEIGHT: 264 LBS | HEIGHT: 73 IN

## 2024-01-30 PROCEDURE — 93798 PHYS/QHP OP CAR RHAB W/ECG: CPT

## 2024-01-30 PROCEDURE — G0422 INTENS CARDIAC REHAB W/EXERC: HCPCS

## 2024-01-30 ASSESSMENT — PATIENT HEALTH QUESTIONNAIRE - PHQ9
2. FEELING DOWN, DEPRESSED OR HOPELESS: 0
7. TROUBLE CONCENTRATING ON THINGS, SUCH AS READING THE NEWSPAPER OR WATCHING TELEVISION: 0
3. TROUBLE FALLING OR STAYING ASLEEP: 1
SUM OF ALL RESPONSES TO PHQ QUESTIONS 1-9: 4
9. THOUGHTS THAT YOU WOULD BE BETTER OFF DEAD, OR OF HURTING YOURSELF: 0
SUM OF ALL RESPONSES TO PHQ QUESTIONS 1-9: 4
4. FEELING TIRED OR HAVING LITTLE ENERGY: 1
SUM OF ALL RESPONSES TO PHQ9 QUESTIONS 1 & 2: 0
SUM OF ALL RESPONSES TO PHQ QUESTIONS 1-9: 4
5. POOR APPETITE OR OVEREATING: 2
1. LITTLE INTEREST OR PLEASURE IN DOING THINGS: 0
6. FEELING BAD ABOUT YOURSELF - OR THAT YOU ARE A FAILURE OR HAVE LET YOURSELF OR YOUR FAMILY DOWN: 0
SUM OF ALL RESPONSES TO PHQ QUESTIONS 1-9: 4
10. IF YOU CHECKED OFF ANY PROBLEMS, HOW DIFFICULT HAVE THESE PROBLEMS MADE IT FOR YOU TO DO YOUR WORK, TAKE CARE OF THINGS AT HOME, OR GET ALONG WITH OTHER PEOPLE: 0

## 2024-01-30 ASSESSMENT — EXERCISE STRESS TEST
PEAK_BP: 164/72
PEAK_HR: 108
PEAK_RPE: 12
PEAK_RPD: 2
PEAK_BP: 164/72

## 2024-01-30 ASSESSMENT — EJECTION FRACTION
EF_VALUE: 30
EF_VALUE: 30

## 2024-01-30 ASSESSMENT — LIFESTYLE VARIABLES
SMOKELESS_TOBACCO: NO
CIGARETTES_PER_DAY: 1 PPD

## 2024-01-30 ASSESSMENT — NEW YORK HEART ASSOCIATION (NYHA) CLASSIFICATION: NYHA FUNCTIONAL CLASS: CLASS II

## 2024-01-30 NOTE — CARDIO/PULMONARY
[facility] Cardiac Rehab ITP Note      Patient Name: Slade Landeros  MRN: [id number]                  : 1960      ITP Note: initial    Patient completed  sessions of cardiac rehab.     Patient arrived to OP Phase II with admitting DX: PCI referred by Dr. Hardin    Physical examination: heart sounds normal, wheezing her during ascultation of lungs,  No BLE swelling, no pitting edema, strong pulse BUE. Patient reports productive cough at night. Denies other signs/symptoms. Note sent to cardiology about symptoms.       Exercise: Patient tolerated warm up, six-minute walk test, 10 minutes of sustained CV exercise on Nu-Step seated stepper, and cool-down. Patient complaints/signs/symptoms: none        POC: Patient will attend OP Phase II program: CR 3x weekly for 12 weeks or until 36 sessions are completed.      Patient educated on  Cardiac A&P, development of coronary artery disease, DASH/MEDIT diet, benefits of exercise, importance of managing stress, importance of managing CHF, need for lipid panel and follow up for HgbA1C & BG values, program outline, insurance estimate, individual lipid panel, medications, and compliance with cardiac rehab program. Patient verbalizes understanding, hand outs provided.          Past Medical History:   Diagnosis Date    CAD (coronary artery disease)     CHF (congestive heart failure) (HCC)     History of PTCA     Tobacco abuse           Current Outpatient Medications   Medication Sig Dispense Refill    bumetanide (BUMEX) 2 MG tablet Take 1 tablet by mouth daily 90 tablet 3    spironolactone (ALDACTONE) 25 MG tablet Take 1 tablet by mouth daily 90 tablet 3    nitroGLYCERIN (NITROSTAT) 0.4 MG SL tablet Place 1 tablet under the tongue every 5 minutes as needed for Chest pain up to max of 3 total doses. If no relief after 1 dose, call 911. 25 tablet 3    aspirin 81 MG EC tablet Take 1 tablet by mouth daily 90 tablet 1    clopidogrel (PLAVIX) 75 MG tablet Take 1

## 2024-01-31 ENCOUNTER — HOSPITAL ENCOUNTER (OUTPATIENT)
Dept: CARDIAC REHAB | Age: 64
Setting detail: THERAPIES SERIES
Discharge: HOME | End: 2024-01-31
Payer: COMMERCIAL

## 2024-01-31 PROCEDURE — 93798 PHYS/QHP OP CAR RHAB W/ECG: CPT

## 2024-01-31 NOTE — CARDIO/PULMONARY
Patient arrives to cardiac rehab for session. Covid screening complete. Patient denies any complaints. Patient denies changes to PMH and medication. Patient tolerates exercise without complaint.  Patient introduced to treadmill and arm ergometer, tolerated without issue.  All equipment used in the care for this patient has been cleaned.  Electronically signed by Graciela Collins RN on 1/31/2024 at 3:50 PM

## 2024-02-02 ENCOUNTER — HOSPITAL ENCOUNTER (OUTPATIENT)
Dept: CARDIAC REHAB | Age: 64
Setting detail: THERAPIES SERIES
Discharge: HOME | End: 2024-02-02
Payer: COMMERCIAL

## 2024-02-02 PROCEDURE — 93798 PHYS/QHP OP CAR RHAB W/ECG: CPT

## 2024-02-02 NOTE — CARDIO/PULMONARY
Patient arrives to cardiac rehab for session. Covid screening complete. Patient denies any complaints. Patient denies changes to PMH and medication. Patient tolerates exercise without complaint.   Patients weight noted to be down 10 lbs from initial intake weight. Patient states he felt like he was taking on fluid and has been taking his diuretics and trying to walk every morning.   Noted patients HR elevated to 141 BPM while on the treadmill at speed 3 mph. Patient asymptomatic. Decreased patients speed to 2.5 mph and HR decreased into the 130s. Patient tolerates the seated equipment without issue.   All equipment used in the care for this patient has been cleaned.  Electronically signed by Graciela Collins RN on 2/2/2024 at 2:56 PM

## 2024-02-05 ENCOUNTER — HOSPITAL ENCOUNTER (OUTPATIENT)
Dept: CARDIAC REHAB | Age: 64
Setting detail: THERAPIES SERIES
Discharge: HOME | End: 2024-02-05
Payer: COMMERCIAL

## 2024-02-05 PROCEDURE — 93798 PHYS/QHP OP CAR RHAB W/ECG: CPT

## 2024-02-05 NOTE — CARDIO/PULMONARY
Patient arrives to cardiac rehab for session. Covid screening complete. Patient denies any complaints. Patient denies changes to PMH and medication. Patient tolerates exercise without complaint.   Weekly education;  discussed  with patient including \"What is High Blood Pressure\" ,modifiable versus non modifiable risk factors, and blood pressure ranges. Handout from [facility] provided.   All equipment used in the care for this patient has been cleaned.  Electronically signed by Graciela Collins RN on 2/5/2024 at 2:10 PM

## 2024-02-07 ENCOUNTER — HOSPITAL ENCOUNTER (OUTPATIENT)
Dept: CARDIAC REHAB | Age: 64
Setting detail: THERAPIES SERIES
Discharge: HOME | End: 2024-02-07
Payer: COMMERCIAL

## 2024-02-07 PROCEDURE — 93798 PHYS/QHP OP CAR RHAB W/ECG: CPT

## 2024-02-07 NOTE — CARDIO/PULMONARY
Patient arrives to cardiac rehab for session. Covid screening complete. Patient denies any complaints. Patient denies changes to PMH and medication. Patient tolerates exercise without complaint.   All equipment used in the care of this patient has been cleaned.   Electronically signed by Radha Linton RN on 2/7/24 at 2:50 PM EST

## 2024-02-09 ENCOUNTER — HOSPITAL ENCOUNTER (OUTPATIENT)
Dept: CARDIAC REHAB | Age: 64
Setting detail: THERAPIES SERIES
Discharge: HOME | End: 2024-02-09
Payer: COMMERCIAL

## 2024-02-09 PROCEDURE — 93798 PHYS/QHP OP CAR RHAB W/ECG: CPT

## 2024-02-09 PROCEDURE — 93797 PHYS/QHP OP CAR RHAB WO ECG: CPT

## 2024-02-09 NOTE — PROGRESS NOTES
Patient arrives to cardiac rehab for session. Covid screening complete. Patient denies any complaints. Patient denies changes to PMH and medication. Patient tolerates exercise without complaint.  Patient has 1:1 with RD. Electronically signed by Marisol Anaya RN on 2/9/2024 at 2:32 PM

## 2024-02-09 NOTE — CARDIO/PULMONARY
Cardiac Rehab Nutrition Assessment - 1:1 Evaluation           NAME: Slade Landeros : 1960 AGE: 64 y.o.  GENDER: male  CARDIAC REHAB ADMITTING DIAGNOSIS: PCI    PROBLEM LIST:  Active Problems:    * No active hospital problems. *  Resolved Problems:    * No resolved hospital problems. *          LABS:   No results found for: \"LABA1C\"  No results found for: \"EAG\"   No results found for: \"CHOL\", \"CHOLPOCT\", \"CHOLX\", \"CHLST\", \"CHOLV\", \"HDL\", \"HDLPOC\", \"HDLC\", \"LDL\", \"LDLC\", \"VLDLC\", \"VLDL\", \"TGLX\", \"TRIGL\"  Lab Results   Component Value Date/Time     2023 07:00 AM    K 4.3 2023 07:00 AM     2023 07:00 AM    CO2 23 2023 07:00 AM    BUN 14 2023 07:00 AM    CREATININE 0.94 2023 07:00 AM    GLUCOSE 178 2023 07:00 AM    GLUCOSE 117 2023 04:15 PM    CALCIUM 9.1 2023 07:00 AM         MEDICATIONS/SUPPLEMENTS:   Prior to Admission medications    Medication Sig Start Date End Date Taking? Authorizing Provider   bumetanide (BUMEX) 2 MG tablet Take 1 tablet by mouth daily 24   Shankar Hardin MD   spironolactone (ALDACTONE) 25 MG tablet Take 1 tablet by mouth daily 24   Shankar Hardin MD   nitroGLYCERIN (NITROSTAT) 0.4 MG SL tablet Place 1 tablet under the tongue every 5 minutes as needed for Chest pain up to max of 3 total doses. If no relief after 1 dose, call 911. 23   Shankar Hardin MD   aspirin 81 MG EC tablet Take 1 tablet by mouth daily 23   Shankar Hardin MD   clopidogrel (PLAVIX) 75 MG tablet Take 1 tablet by mouth daily 23   Shankar Hardin MD   atorvastatin (LIPITOR) 80 MG tablet Take 1 tablet by mouth daily 23   Shankar Hardin MD   magnesium oxide (MAG-OX) 400 MG tablet Take 1 tablet by mouth daily 23   Bob Zhao MD   metoprolol succinate (TOPROL XL) 50 MG extended release tablet Take 1 tablet by mouth daily 23   ProviderBob MD   Multiple Vitamin (MULTIVITAMIN) capsule Take 1 capsule

## 2024-02-12 ENCOUNTER — HOSPITAL ENCOUNTER (OUTPATIENT)
Dept: CARDIAC REHAB | Age: 64
Setting detail: THERAPIES SERIES
Discharge: HOME | End: 2024-02-12
Payer: COMMERCIAL

## 2024-02-12 PROCEDURE — 93798 PHYS/QHP OP CAR RHAB W/ECG: CPT

## 2024-02-12 NOTE — CARDIO/PULMONARY
Patient arrives to cardiac rehab for session. Covid screening complete. Patient denies any complaints. Patient denies changes to PMH and medication. Patient tolerates exercise without complaint.   All equipment used in the care for this patient has been cleaned.  Electronically signed by Graciela Collins RN on 2/12/2024 at 3:11 PM

## 2024-02-14 ENCOUNTER — HOSPITAL ENCOUNTER (OUTPATIENT)
Dept: CARDIAC REHAB | Age: 64
Setting detail: THERAPIES SERIES
Discharge: HOME | End: 2024-02-14
Payer: COMMERCIAL

## 2024-02-14 PROCEDURE — 93798 PHYS/QHP OP CAR RHAB W/ECG: CPT

## 2024-02-14 NOTE — CARDIO/PULMONARY
Patient arrives to cardiac rehab for session. Covid screening complete. Patient denies any complaints. Patient denies changes to PMH and medication. Patient tolerates exercise without complaint.   Weekly education; discussed cholesterol, types of cholesterol, medications, risk factors (modifiable versus nonmodifiable), and dietary adjustments to improve cholesterol. Discussed patients most recent lipid panel values and WDL values. Handout given to patient.   Updated patients lipid panel in flowsheet with labs drawn in 12/2022. Patient sees Dr Hardin in March after having his Echo repeated and will ask for a repeat lipid panel since it has been over one year and he is taking Lipitor 80 mg daily.   All equipment used in the care for this patient has been cleaned.  Electronically signed by Graciela Collins RN on 2/14/2024 at 3:59 PM

## 2024-02-16 ENCOUNTER — HOSPITAL ENCOUNTER (OUTPATIENT)
Dept: CARDIAC REHAB | Age: 64
Setting detail: THERAPIES SERIES
Discharge: HOME | End: 2024-02-16
Payer: COMMERCIAL

## 2024-02-16 PROCEDURE — 93798 PHYS/QHP OP CAR RHAB W/ECG: CPT

## 2024-02-16 NOTE — CARDIO/PULMONARY
Patient arrives to cardiac rehab for session. Covid screening complete. Patient denies any complaints. Patient denies changes to PMH and medication. Patient tolerates exercise without complaint.   Noted patients HR elevated to 141 BPM while on the treadmill. Patient asymptomatic. HR decreases appropriately with rest.   All equipment used in the care for this patient has been cleaned.  Electronically signed by Graciela Collins RN on 2/16/2024 at 2:45 PM

## 2024-02-19 ENCOUNTER — HOSPITAL ENCOUNTER (OUTPATIENT)
Dept: CARDIAC REHAB | Age: 64
Setting detail: THERAPIES SERIES
Discharge: HOME | End: 2024-02-19
Payer: COMMERCIAL

## 2024-02-19 PROCEDURE — 93798 PHYS/QHP OP CAR RHAB W/ECG: CPT

## 2024-02-19 ASSESSMENT — EJECTION FRACTION: EF_VALUE: 30

## 2024-02-19 ASSESSMENT — LIFESTYLE VARIABLES
SMOKELESS_TOBACCO: NO
CIGARETTES_PER_DAY: 1 PPD

## 2024-02-19 ASSESSMENT — EXERCISE STRESS TEST: PEAK_BP: 138/74

## 2024-02-19 NOTE — CARDIO/PULMONARY
Patient arrives to cardiac rehab for session. Covid screening complete. Patient denies any complaints. Patient denies changes to PMH and medication. Patient tolerates exercise without complaint.      Weekly education: Discussed \"How Can I Improve My Cholesterol\", including lifestyle changes ie. heart-healthy foods, maintaining a healthy weight, being physically active, not smoking, and taking medication. Handout discusses food we should eat, and food we should limit. Handout from [facility] given.     All equipment used in the care for this patient has been cleaned.  Electronically signed by Graciela Collins RN on 2/19/2024 at 2:21 PM

## 2024-02-19 NOTE — CARDIO/PULMONARY
[facility] Cardiac Rehab ITP Note      Patient Name: Slade Landeros  MRN: [id number]                  : 1960      ITP Note: re-assessment    Patient completed 10/36 sessions of cardiac rehab.     Past Medical History:   Diagnosis Date    CAD (coronary artery disease)     CHF (congestive heart failure) ([facility])     History of PTCA     Tobacco abuse           Current Outpatient Medications   Medication Sig Dispense Refill    bumetanide (BUMEX) 2 MG tablet Take 1 tablet by mouth daily 90 tablet 3    spironolactone (ALDACTONE) 25 MG tablet Take 1 tablet by mouth daily 90 tablet 3    nitroGLYCERIN (NITROSTAT) 0.4 MG SL tablet Place 1 tablet under the tongue every 5 minutes as needed for Chest pain up to max of 3 total doses. If no relief after 1 dose, call 911. 25 tablet 3    aspirin 81 MG EC tablet Take 1 tablet by mouth daily 90 tablet 1    clopidogrel (PLAVIX) 75 MG tablet Take 1 tablet by mouth daily 30 tablet 3    atorvastatin (LIPITOR) 80 MG tablet Take 1 tablet by mouth daily 90 tablet 3    magnesium oxide (MAG-OX) 400 MG tablet Take 1 tablet by mouth daily      metoprolol succinate (TOPROL XL) 50 MG extended release tablet Take 1 tablet by mouth daily      Multiple Vitamin (MULTIVITAMIN) capsule Take 1 capsule by mouth      potassium chloride (KLOR-CON M) 20 MEQ TBCR extended release tablet Take 1 tablet by mouth daily      sacubitril-valsartan (ENTRESTO) 49-51 MG per tablet        No current facility-administered medications for this encounter.        Labs:   Lipid panel: 2022  Total: 163  Tri  HDL: 46  LDL: 101     HgbA1c: 2023  7.8      Ejection Fraction & Date: Heart Cath 2023 30%  *Echo ordered on 3/4/2024 at 0830*            Graciela Collins, RN  2024

## 2024-02-21 ENCOUNTER — HOSPITAL ENCOUNTER (OUTPATIENT)
Dept: CARDIAC REHAB | Age: 64
Setting detail: THERAPIES SERIES
Discharge: HOME | End: 2024-02-21
Payer: COMMERCIAL

## 2024-02-21 PROCEDURE — 93798 PHYS/QHP OP CAR RHAB W/ECG: CPT

## 2024-02-21 NOTE — CARDIO/PULMONARY
Patient arrives to cardiac rehab for session. Covid screening complete. Patient denies any complaints. Patient denies changes to PMH and medication. Patient tolerates exercise without complaint.     Patient HR noted to be 150 on treadmill with speed of 3 mph. Treadmill speed decreased to 2.4 mph. HR decreased.     Patient noted to be exerting himself too much on nustep. Nustep resistance at 7. Patients spo2 97% on room air. Patients BP elevated to 190/78. Patient instructed to rest, and resume at a lower resistance level of 5. BP improved.     All equipment used in the care for this patient has been cleaned.  Electronically signed by Graciela Collins RN on 2/21/2024 at 3:20 PM

## 2024-02-23 ENCOUNTER — HOSPITAL ENCOUNTER (OUTPATIENT)
Dept: CARDIAC REHAB | Age: 64
Setting detail: THERAPIES SERIES
Discharge: HOME | End: 2024-02-23
Payer: COMMERCIAL

## 2024-02-23 PROCEDURE — 93798 PHYS/QHP OP CAR RHAB W/ECG: CPT

## 2024-02-23 NOTE — CARDIO/PULMONARY
Patient arrives to cardiac rehab for session. Covid screening complete. Patient denies any complaints. Patient denies changes to PMH and medication. Patient tolerates exercise without complaint.     Patients weight down 5 lbs. Patient reports he took his water pills as he was feeling he was retaining.     All equipment used in the care for this patient has been cleaned.  Electronically signed by Graciela Collins RN on 2/23/2024 at 3:29 PM

## 2024-02-26 ENCOUNTER — HOSPITAL ENCOUNTER (OUTPATIENT)
Dept: CARDIAC REHAB | Age: 64
Setting detail: THERAPIES SERIES
Discharge: HOME | End: 2024-02-26
Payer: COMMERCIAL

## 2024-02-26 PROCEDURE — 93798 PHYS/QHP OP CAR RHAB W/ECG: CPT

## 2024-02-26 NOTE — CARDIO/PULMONARY
Patient arrives to cardiac rehab for session. Covid screening complete. Patient denies any complaints. Patient denies changes to PMH and medication. Patient tolerates exercise without complaint.   Weekly education; Discussed \"How to Manage Blood Pressure\", including an explanation on what systolic and diastolic numbers, Blood pressure categories, tips to help lower high blood pressure and a blood pressure log for the patient to use at home. Handout from [facility] given to patient.   Patient bp low after completion of exercise. Patient reports he had a decent breakfast but has not drank any water today. Patient given 2 glasses of water. BP improved. Patient asymptomatic.   All equipment used in the care for this patient has been cleaned.  Electronically signed by Graciela Collins RN on 2/26/2024 at 3:06 PM

## 2024-02-28 ENCOUNTER — HOSPITAL ENCOUNTER (OUTPATIENT)
Dept: CARDIAC REHAB | Age: 64
Setting detail: THERAPIES SERIES
Discharge: HOME | End: 2024-02-28
Payer: COMMERCIAL

## 2024-02-28 PROCEDURE — 93798 PHYS/QHP OP CAR RHAB W/ECG: CPT

## 2024-02-28 NOTE — CARDIO/PULMONARY
Patient arrives to cardiac rehab for session. Covid screening complete. Patient denies any complaints. Patient denies changes to PMH and medication. Patient tolerates exercise without complaint.     All equipment used in the care for this patient has been cleaned.  Electronically signed by Graciela Collins RN on 2/28/2024 at 2:16 PM

## 2024-03-01 ENCOUNTER — HOSPITAL ENCOUNTER (OUTPATIENT)
Dept: CARDIAC REHAB | Age: 64
Setting detail: THERAPIES SERIES
Discharge: HOME | End: 2024-03-01
Payer: COMMERCIAL

## 2024-03-01 PROCEDURE — 93798 PHYS/QHP OP CAR RHAB W/ECG: CPT

## 2024-03-01 NOTE — CARDIO/PULMONARY
Patient arrives to cardiac rehab for session. Covid screening complete. Patient denies any complaints. Patient denies changes to PMH and medication. Patient tolerates exercise without complaint. Patient fatigued today, and didn't sleep well.   Patient started on back extension resistance machine, tolerated well.   All equipment used in the care for this patient has been cleaned.  Electronically signed by Graciela Collins RN on 3/1/2024 at 3:45 PM

## 2024-03-04 ENCOUNTER — HOSPITAL ENCOUNTER (OUTPATIENT)
Age: 64
Discharge: HOME | End: 2024-03-06
Attending: INTERNAL MEDICINE
Payer: COMMERCIAL

## 2024-03-04 ENCOUNTER — HOSPITAL ENCOUNTER (OUTPATIENT)
Dept: CARDIAC REHAB | Age: 64
Setting detail: THERAPIES SERIES
Discharge: HOME | End: 2024-03-04
Payer: COMMERCIAL

## 2024-03-04 VITALS
WEIGHT: 264 LBS | BODY MASS INDEX: 34.99 KG/M2 | SYSTOLIC BLOOD PRESSURE: 128 MMHG | HEIGHT: 73 IN | DIASTOLIC BLOOD PRESSURE: 66 MMHG

## 2024-03-04 DIAGNOSIS — I25.5 ISCHEMIC CARDIOMYOPATHY: ICD-10-CM

## 2024-03-04 LAB
ECHO AR MAX VEL PISA: 3.4 M/S
ECHO AV CUSP MM: 2 CM
ECHO AV MEAN GRADIENT: 4 MMHG
ECHO AV MEAN VELOCITY: 0.9 M/S
ECHO AV PEAK GRADIENT: 8 MMHG
ECHO AV PEAK VELOCITY: 1.5 M/S
ECHO AV REGURGITANT PHT: 601.3 MILLISECOND
ECHO AV VELOCITY RATIO: 0.53
ECHO AV VTI: 31.2 CM
ECHO BSA: 2.48 M2
ECHO EST RA PRESSURE: 10 MMHG
ECHO LA DIAMETER INDEX: 2.02 CM/M2
ECHO LA DIAMETER: 4.9 CM
ECHO LA VOL A-L A2C: 51 ML (ref 18–58)
ECHO LA VOL A-L A4C: 71 ML (ref 18–58)
ECHO LA VOL MOD A2C: 50 ML (ref 18–58)
ECHO LA VOL MOD A4C: 68 ML (ref 18–58)
ECHO LA VOLUME AREA LENGTH: 64 ML
ECHO LA VOLUME INDEX A-L A2C: 21 ML/M2 (ref 16–34)
ECHO LA VOLUME INDEX A-L A4C: 29 ML/M2 (ref 16–34)
ECHO LA VOLUME INDEX AREA LENGTH: 26 ML/M2 (ref 16–34)
ECHO LA VOLUME INDEX MOD A2C: 21 ML/M2 (ref 16–34)
ECHO LA VOLUME INDEX MOD A4C: 28 ML/M2 (ref 16–34)
ECHO LV E' LATERAL VELOCITY: 10 CM/S
ECHO LV E' SEPTAL VELOCITY: 6 CM/S
ECHO LV EDV A2C: 175 ML
ECHO LV EDV A4C: 190 ML
ECHO LV EDV BP: 186 ML (ref 67–155)
ECHO LV EDV INDEX A4C: 79 ML/M2
ECHO LV EDV INDEX BP: 77 ML/M2
ECHO LV EDV NDEX A2C: 72 ML/M2
ECHO LV EJECTION FRACTION A2C: 33 %
ECHO LV EJECTION FRACTION A4C: 22 %
ECHO LV EJECTION FRACTION BIPLANE: 26 % (ref 55–100)
ECHO LV ESV A2C: 117 ML
ECHO LV ESV A4C: 148 ML
ECHO LV ESV BP: 138 ML (ref 22–58)
ECHO LV ESV INDEX A2C: 48 ML/M2
ECHO LV ESV INDEX A4C: 61 ML/M2
ECHO LV ESV INDEX BP: 57 ML/M2
ECHO LV FRACTIONAL SHORTENING: 20 % (ref 28–44)
ECHO LV INTERNAL DIMENSION DIASTOLE INDEX: 2.52 CM/M2
ECHO LV INTERNAL DIMENSION DIASTOLIC: 6.1 CM (ref 4.2–5.9)
ECHO LV INTERNAL DIMENSION SYSTOLIC INDEX: 2.02 CM/M2
ECHO LV INTERNAL DIMENSION SYSTOLIC: 4.9 CM
ECHO LV IVSD: 1.2 CM (ref 0.6–1)
ECHO LV IVSS: 1.3 CM
ECHO LV MASS 2D: 287.5 G (ref 88–224)
ECHO LV MASS INDEX 2D: 118.8 G/M2 (ref 49–115)
ECHO LV POSTERIOR WALL DIASTOLIC: 1 CM (ref 0.6–1)
ECHO LV POSTERIOR WALL SYSTOLIC: 1.4 CM
ECHO LV RELATIVE WALL THICKNESS RATIO: 0.33
ECHO LVOT AV VTI INDEX: 0.59
ECHO LVOT MEAN GRADIENT: 1 MMHG
ECHO LVOT PEAK GRADIENT: 2 MMHG
ECHO LVOT PEAK VELOCITY: 0.8 M/S
ECHO LVOT VTI: 18.5 CM
ECHO MV A VELOCITY: 0.97 M/S
ECHO MV E DECELERATION TIME (DT): 186.3 MS
ECHO MV E VELOCITY: 0.9 M/S
ECHO MV E/A RATIO: 0.93
ECHO MV E/E' LATERAL: 9
ECHO MV E/E' RATIO (AVERAGED): 12
ECHO MV REGURGITANT PEAK GRADIENT: 81 MMHG
ECHO MV REGURGITANT PEAK VELOCITY: 4.5 M/S
ECHO PV MAX VELOCITY: 0.8 M/S
ECHO PV PEAK GRADIENT: 2 MMHG
ECHO RIGHT VENTRICULAR SYSTOLIC PRESSURE (RVSP): 57 MMHG
ECHO RV TAPSE: 1.7 CM (ref 1.7–?)
ECHO RVOT PEAK GRADIENT: 1 MMHG
ECHO RVOT PEAK VELOCITY: 0.5 M/S
ECHO TV REGURGITANT MAX VELOCITY: 3.42 M/S
ECHO TV REGURGITANT PEAK GRADIENT: 47 MMHG

## 2024-03-04 PROCEDURE — 6360000004 HC RX CONTRAST MEDICATION: Performed by: INTERNAL MEDICINE

## 2024-03-04 PROCEDURE — 93306 TTE W/DOPPLER COMPLETE: CPT | Performed by: INTERNAL MEDICINE

## 2024-03-04 PROCEDURE — C8929 TTE W OR WO FOL WCON,DOPPLER: HCPCS

## 2024-03-04 PROCEDURE — 93798 PHYS/QHP OP CAR RHAB W/ECG: CPT

## 2024-03-04 RX ADMIN — PERFLUTREN 2 ML: 6.52 INJECTION, SUSPENSION INTRAVENOUS at 10:00

## 2024-03-04 NOTE — CARDIO/PULMONARY
Patient arrives to cardiac rehab for session. Covid screening complete. Patient denies any complaints. Patient denies changes to PMH and medication. Patient tolerates exercise without complaint.   Weekly education: Discussed blood pressure, why blood pressure is a \"silent killer\", who is at risk for HTN, Ways to decrease high blood pressure, and patients specific BP medications. Patient given handout.  All equipment used in the care for this patient has been cleaned.  Electronically signed by Graciela Collins RN on 3/4/2024 at 3:37 PM

## 2024-03-05 ENCOUNTER — TELEPHONE (OUTPATIENT)
Dept: CARDIOLOGY CLINIC | Age: 64
End: 2024-03-05

## 2024-03-05 NOTE — TELEPHONE ENCOUNTER
----- Message from Joaquin Anguiano DO sent at 3/4/2024  3:47 PM EST -----  Echo is abnormal with severe AI. Please follow up with DR Hardin.

## 2024-03-06 ENCOUNTER — OFFICE VISIT (OUTPATIENT)
Dept: CARDIOLOGY CLINIC | Age: 64
End: 2024-03-06
Payer: COMMERCIAL

## 2024-03-06 ENCOUNTER — HOSPITAL ENCOUNTER (OUTPATIENT)
Dept: CARDIAC REHAB | Age: 64
Setting detail: THERAPIES SERIES
Discharge: HOME | End: 2024-03-06
Payer: COMMERCIAL

## 2024-03-06 VITALS
HEART RATE: 110 BPM | OXYGEN SATURATION: 96 % | SYSTOLIC BLOOD PRESSURE: 128 MMHG | WEIGHT: 253 LBS | DIASTOLIC BLOOD PRESSURE: 72 MMHG | HEIGHT: 73 IN | BODY MASS INDEX: 33.53 KG/M2

## 2024-03-06 DIAGNOSIS — Z95.5 STATUS POST CORONARY ARTERY STENT PLACEMENT: ICD-10-CM

## 2024-03-06 DIAGNOSIS — I10 HYPERTENSION, UNSPECIFIED TYPE: ICD-10-CM

## 2024-03-06 DIAGNOSIS — I25.5 ISCHEMIC CARDIOMYOPATHY: Primary | ICD-10-CM

## 2024-03-06 DIAGNOSIS — Z00.00 PE (PHYSICAL EXAM), ANNUAL: ICD-10-CM

## 2024-03-06 DIAGNOSIS — E78.5 DYSLIPIDEMIA: ICD-10-CM

## 2024-03-06 DIAGNOSIS — I35.1 NONRHEUMATIC AORTIC VALVE INSUFFICIENCY: ICD-10-CM

## 2024-03-06 DIAGNOSIS — I50.43 CHF (CONGESTIVE HEART FAILURE), NYHA CLASS I, ACUTE ON CHRONIC, COMBINED (HCC): ICD-10-CM

## 2024-03-06 PROCEDURE — 3078F DIAST BP <80 MM HG: CPT | Performed by: INTERNAL MEDICINE

## 2024-03-06 PROCEDURE — 99214 OFFICE O/P EST MOD 30 MIN: CPT | Performed by: INTERNAL MEDICINE

## 2024-03-06 PROCEDURE — 93798 PHYS/QHP OP CAR RHAB W/ECG: CPT

## 2024-03-06 PROCEDURE — 3074F SYST BP LT 130 MM HG: CPT | Performed by: INTERNAL MEDICINE

## 2024-03-06 RX ORDER — METOPROLOL SUCCINATE 50 MG/1
50 TABLET, EXTENDED RELEASE ORAL DAILY
Qty: 90 TABLET | Refills: 3 | Status: SHIPPED | OUTPATIENT
Start: 2024-03-06

## 2024-03-06 RX ORDER — CLOPIDOGREL BISULFATE 75 MG/1
75 TABLET ORAL DAILY
Qty: 90 TABLET | Refills: 3 | Status: SHIPPED | OUTPATIENT
Start: 2024-03-06

## 2024-03-06 ASSESSMENT — ENCOUNTER SYMPTOMS
SHORTNESS OF BREATH: 0
WHEEZING: 0
CHEST TIGHTNESS: 0
NAUSEA: 0
STRIDOR: 0
BLOOD IN STOOL: 0
COUGH: 0
EYES NEGATIVE: 1
GASTROINTESTINAL NEGATIVE: 1
RESPIRATORY NEGATIVE: 1

## 2024-03-06 NOTE — PROGRESS NOTES
Take 1 tablet by mouth daily     Dispense:  90 tablet     Refill:  3    metoprolol succinate (TOPROL XL) 50 MG extended release tablet     Sig: Take 1 tablet by mouth daily     Dispense:  90 tablet     Refill:  3    sacubitril-valsartan (ENTRESTO) 24-26 MG per tablet     Sig: Take 1 tablet by mouth 2 times daily     Dispense:  180 tablet     Refill:  3       Assessment/Plan:    1. PE (physical exam), annual     - EKG 12 lead    2. CHF (congestive heart failure), NYHA class I, acute on chronic, combined (HCC)  Continue all meds. Low salt diet     He has not had GDMT optimal yet.  He will resume all CV meds. Repeat Echo in 2 mo and OV for discussion.     LV remains poor- RBA Cath PCI discussed.     3. Hypertension, unspecified type   Stable low salt diet.      Start CR  Counseling:  Heart Healthy Lifestyle, Low Salt Diet, Take Precautions to Prevent Falls, and Walk Daily    Return in about 2 months (around 5/6/2024).      Electronically signed by DAV MOROCHO MD on 3/6/2024 at 1:36 PM

## 2024-03-06 NOTE — CARDIO/PULMONARY
Patient arrives to cardiac rehab for session. Covid screening complete. Patient denies any complaints. Patient denies changes to PMH and medication. Patient tolerates exercise without complaint.   Patient had follow up with Dr Hardin regarding recent Echo. Per office visit notes, patient was not taking his entresto and metoprolol due to low BP. Patient will resume these, with a repeat echo in two months.   All equipment used in the care for this patient has been cleaned.  Electronically signed by Graciela Collins RN on 3/6/2024 at 3:08 PM

## 2024-03-08 ENCOUNTER — HOSPITAL ENCOUNTER (OUTPATIENT)
Dept: CARDIAC REHAB | Age: 64
Setting detail: THERAPIES SERIES
Discharge: HOME | End: 2024-03-08
Payer: COMMERCIAL

## 2024-03-08 PROCEDURE — 93798 PHYS/QHP OP CAR RHAB W/ECG: CPT

## 2024-03-08 NOTE — CARDIO/PULMONARY
Patient arrives to cardiac rehab for session. Covid screening complete. Patient denies any complaints. Patient denies changes to PMH and medication. Patient tolerates exercise without complaint.   Noted patients HR elevated in the 150's while on the treadmill. Patients HR decreased when he slowed down. Patient states he did resume his entresto and metoprolol.   All equipment used in the care for this patient has been cleaned.  Electronically signed by Graciela Collins RN on 3/8/2024 at 2:41 PM

## 2024-03-11 ENCOUNTER — HOSPITAL ENCOUNTER (OUTPATIENT)
Dept: CARDIAC REHAB | Age: 64
Setting detail: THERAPIES SERIES
Discharge: HOME | End: 2024-03-11
Payer: COMMERCIAL

## 2024-03-11 PROCEDURE — 93798 PHYS/QHP OP CAR RHAB W/ECG: CPT

## 2024-03-11 ASSESSMENT — LIFESTYLE VARIABLES
SMOKELESS_TOBACCO: NO
CIGARETTES_PER_DAY: 1 PPD

## 2024-03-11 ASSESSMENT — EJECTION FRACTION: EF_VALUE: 25

## 2024-03-11 ASSESSMENT — EXERCISE STRESS TEST: PEAK_BP: 140/80

## 2024-03-11 NOTE — CARDIO/PULMONARY
Patient arrives to cardiac rehab for session. Covid screening complete. Patient denies any complaints. Patient denies changes to PMH and medication. Patient tolerates exercise without complaint.   Weekly education: Discussed blood thinners (two most common; anticoagulants and antiplatelets), how to prevent bleeding when taking a blood thinner (preventing falls, protecting your skin, preventing constipation, keeping your mouth healthy, and treating nosebleeds). Handout provided with patients specific blood thinner(s)  highlighted.   Patient weight down approximately 6 lbs from previous weigh in.   All equipment used in the care for this patient has been cleaned.  Electronically signed by Graciela Collins RN on 3/11/2024 at 3:00 PM    
Mariza  3/11/2024

## 2024-03-13 ENCOUNTER — HOSPITAL ENCOUNTER (OUTPATIENT)
Dept: CARDIAC REHAB | Age: 64
Setting detail: THERAPIES SERIES
Discharge: HOME | End: 2024-03-13
Payer: COMMERCIAL

## 2024-03-13 PROCEDURE — 93798 PHYS/QHP OP CAR RHAB W/ECG: CPT

## 2024-03-13 NOTE — CARDIO/PULMONARY
Patient arrives to cardiac rehab for session. Covid screening complete. Patient denies any complaints. Patient denies changes to PMH and medication. Patient tolerates exercise without complaint.   All equipment used in the care for this patient has been cleaned.  Electronically signed by Graciela Collins RN on 3/13/2024 at 2:34 PM

## 2024-03-15 ENCOUNTER — HOSPITAL ENCOUNTER (OUTPATIENT)
Dept: CARDIAC REHAB | Age: 64
Setting detail: THERAPIES SERIES
Discharge: HOME | End: 2024-03-15
Payer: COMMERCIAL

## 2024-03-15 PROCEDURE — 93798 PHYS/QHP OP CAR RHAB W/ECG: CPT

## 2024-03-15 NOTE — PROGRESS NOTES
Patient arrives to cardiac rehab for session. Covid screening complete. Patient denies any complaints. Patient denies changes to PMH and medication. Patient tolerates exercise without complaint, with independent weight training. Electronically signed by Marisol Anaya RN on 3/15/2024 at 3:14 PM

## 2024-03-18 ENCOUNTER — HOSPITAL ENCOUNTER (OUTPATIENT)
Dept: CARDIAC REHAB | Age: 64
Setting detail: THERAPIES SERIES
Discharge: HOME | End: 2024-03-18
Payer: COMMERCIAL

## 2024-03-18 PROCEDURE — 93798 PHYS/QHP OP CAR RHAB W/ECG: CPT

## 2024-03-18 NOTE — CARDIO/PULMONARY
Patient arrives to cardiac rehab for session. Covid screening complete. Patient denies any complaints. Patient denies changes to PMH and medication. Patient tolerates exercise without complaint, including independent weight session.     Weekly education; Discussed angina, causes of angina, how to treat it and when to call 911/ go to ER,  different cardiac testing (echo, cardiac cath, stress test), medications (nitro, beta-blockers, calcium channel blockers, nitrates) and procedures (angioplasty and strent, heart bypass surgery, EECP), risk factors (modifiable versus nonmodifiable) , and managing stress.  Educational booklet given to patient.     Patients weight is down 2 lbs from previous weigh in.     All equipment used in the care for this patient has been cleaned.  Electronically signed by Graciela Collins RN on 3/18/2024 at 3:13 PM

## 2024-03-20 ENCOUNTER — HOSPITAL ENCOUNTER (OUTPATIENT)
Dept: CARDIAC REHAB | Age: 64
Setting detail: THERAPIES SERIES
End: 2024-03-20
Payer: COMMERCIAL

## 2024-03-22 ENCOUNTER — HOSPITAL ENCOUNTER (OUTPATIENT)
Dept: CARDIAC REHAB | Age: 64
Setting detail: THERAPIES SERIES
Discharge: HOME | End: 2024-03-22
Payer: COMMERCIAL

## 2024-03-22 PROCEDURE — 93798 PHYS/QHP OP CAR RHAB W/ECG: CPT

## 2024-03-22 NOTE — CARDIO/PULMONARY
Patient arrives to cardiac rehab for session. Covid screening complete. Patient denies any complaints. Patient denies changes to PMH and medication. Patient tolerates exercise without complaint, including independent weight training.     Patient given scale in order to monitor daily weights.     All equipment used in the care for this patient has been cleaned.  Electronically signed by Graciela Collins RN on 3/22/2024 at 2:32 PM

## 2024-03-25 ENCOUNTER — HOSPITAL ENCOUNTER (OUTPATIENT)
Dept: CARDIAC REHAB | Age: 64
Setting detail: THERAPIES SERIES
Discharge: HOME | End: 2024-03-25
Payer: COMMERCIAL

## 2024-03-25 PROCEDURE — 93798 PHYS/QHP OP CAR RHAB W/ECG: CPT

## 2024-03-25 NOTE — CARDIO/PULMONARY
Patient arrives to cardiac rehab for session. Covid screening complete. Patient denies any complaints. Patient denies changes to PMH and medication. Patient tolerates exercise without complaint, including independent weight session.     Weekly education; Discussed non-modifiable risk factors (age, gender, previous cardiac history, ethnicity, and family history) versus modifiable risk factors (stress, cholesterol, diabetes , sleep, stop drinking alcohol, stop smoking, exercise, diet, blood pressure and weight loss). Handout provided.     All equipment used in the care for this patient has been cleaned.  Electronically signed by Graciela Collins RN on 3/25/2024 at 3:20 PM

## 2024-03-27 ENCOUNTER — HOSPITAL ENCOUNTER (OUTPATIENT)
Dept: CARDIAC REHAB | Age: 64
Setting detail: THERAPIES SERIES
Discharge: HOME | End: 2024-03-27
Payer: COMMERCIAL

## 2024-03-27 PROCEDURE — 93798 PHYS/QHP OP CAR RHAB W/ECG: CPT

## 2024-03-27 NOTE — CARDIO/PULMONARY
Patient arrives to cardiac rehab for session. Covid screening complete. Patient denies any complaints. Patient denies changes to PMH and medication. Patient tolerates exercise without complaint, including independent weight session.     All equipment used in the care for this patient has been cleaned.  Electronically signed by Graciela Collins RN on 3/27/2024 at 2:29 PM

## 2024-03-29 ENCOUNTER — HOSPITAL ENCOUNTER (OUTPATIENT)
Dept: CARDIAC REHAB | Age: 64
Setting detail: THERAPIES SERIES
Discharge: HOME | End: 2024-03-29
Payer: COMMERCIAL

## 2024-03-29 PROCEDURE — 93798 PHYS/QHP OP CAR RHAB W/ECG: CPT

## 2024-03-29 NOTE — CARDIO/PULMONARY
Patient arrives to cardiac rehab for session. Covid screening complete. Patient denies any complaints. Patient denies changes to PMH and medication. Patient tolerates exercise without complaint, including independent weight session.     All equipment used in the care for this patient has been cleaned.  Electronically signed by Graciela Collins RN on 3/29/2024 at 2:09 PM

## 2024-04-01 ENCOUNTER — HOSPITAL ENCOUNTER (OUTPATIENT)
Dept: CARDIAC REHAB | Age: 64
Setting detail: THERAPIES SERIES
Discharge: HOME | End: 2024-04-01
Payer: COMMERCIAL

## 2024-04-01 ASSESSMENT — LIFESTYLE VARIABLES
SMOKELESS_TOBACCO: NO
CIGARETTES_PER_DAY: 1 PPD

## 2024-04-01 ASSESSMENT — EJECTION FRACTION: EF_VALUE: 25

## 2024-04-01 ASSESSMENT — EXERCISE STRESS TEST: PEAK_BP: 122/84

## 2024-04-01 NOTE — PROGRESS NOTES
Patient arrives to cardiac rehab for session. Covid screening complete. Patient denies any complaints. Patient denies changes to PMH and medication. Patient tolerates exercise without complaint.    Weekly education: How the body responds to stress and healthy ways to cope. Healthy tips to help you cope including exercise, meditation, distractions, tackling one challenge at a time, asking for help, and relaxing. What chronic stress can do to your body, including individual organs such as your heart, brain, and kidneys.    Electronically signed by Marisol Anaya RN on 4/1/2024 at 1:51 PM

## 2024-04-01 NOTE — CARDIO/PULMONARY
[facility] Cardiac Rehab ITP Note      Patient Name: Slade Landeros  MRN: [id number]                  : 1960      ITP Note: re-assessment    Patient completed  sessions of cardiac rehab.     Past Medical History:   Diagnosis Date    CAD (coronary artery disease)     CHF (congestive heart failure) (HCC)     History of PTCA     Tobacco abuse           Current Outpatient Medications   Medication Sig Dispense Refill    clopidogrel (PLAVIX) 75 MG tablet Take 1 tablet by mouth daily 90 tablet 3    metoprolol succinate (TOPROL XL) 50 MG extended release tablet Take 1 tablet by mouth daily 90 tablet 3    sacubitril-valsartan (ENTRESTO) 24-26 MG per tablet Take 1 tablet by mouth 2 times daily 180 tablet 3    bumetanide (BUMEX) 2 MG tablet Take 1 tablet by mouth daily 90 tablet 3    spironolactone (ALDACTONE) 25 MG tablet Take 1 tablet by mouth daily 90 tablet 3    nitroGLYCERIN (NITROSTAT) 0.4 MG SL tablet Place 1 tablet under the tongue every 5 minutes as needed for Chest pain up to max of 3 total doses. If no relief after 1 dose, call 911. 25 tablet 3    aspirin 81 MG EC tablet Take 1 tablet by mouth daily 90 tablet 1    atorvastatin (LIPITOR) 80 MG tablet Take 1 tablet by mouth daily 90 tablet 3    magnesium oxide (MAG-OX) 400 MG tablet Take 1 tablet by mouth daily      Multiple Vitamin (MULTIVITAMIN) capsule Take 1 capsule by mouth      potassium chloride (KLOR-CON M) 20 MEQ TBCR extended release tablet Take 1 tablet by mouth daily       No current facility-administered medications for this encounter.        Labs:   Lipid panel: 2022  Total: 163  Tri  HDL: 46  LDL: 101      HgbA1c: 2023  7.8        Ejection Fraction & Date: 3/6/24 Echo EF 25% per recent cardiology notes          [facility] Activity Status Index: 7.25         [facility]: 16         PHQ9: 4         Falls Risk: 0         Cardiac Knowledge Pre- Test: 9         Rate Your Plate: 56                  Wyatt

## 2024-04-03 ENCOUNTER — HOSPITAL ENCOUNTER (OUTPATIENT)
Dept: CARDIAC REHAB | Age: 64
Setting detail: THERAPIES SERIES
Discharge: HOME | End: 2024-04-03
Payer: COMMERCIAL

## 2024-04-03 PROCEDURE — 93798 PHYS/QHP OP CAR RHAB W/ECG: CPT

## 2024-04-03 NOTE — PROGRESS NOTES
Patient arrives to cardiac rehab for session. Covid screening complete. Patient denies any complaints. Patient denies changes to PMH and medication. Patient tolerates exercise without complaint, with independent weight training.  Electronically signed by Marisol Anaya RN on 4/3/2024 at 3:10 PM

## 2024-04-05 ENCOUNTER — APPOINTMENT (OUTPATIENT)
Dept: CARDIAC REHAB | Age: 64
End: 2024-04-05
Payer: COMMERCIAL

## 2024-04-08 ENCOUNTER — APPOINTMENT (OUTPATIENT)
Dept: CARDIAC REHAB | Age: 64
End: 2024-04-08
Payer: COMMERCIAL

## 2024-04-09 ENCOUNTER — HOSPITAL ENCOUNTER (OUTPATIENT)
Dept: CARDIAC REHAB | Age: 64
Setting detail: THERAPIES SERIES
Discharge: HOME | End: 2024-04-09
Payer: COMMERCIAL

## 2024-04-09 PROCEDURE — 93798 PHYS/QHP OP CAR RHAB W/ECG: CPT

## 2024-04-09 NOTE — CARDIO/PULMONARY
Patient arrives to cardiac rehab for session. Covid screening complete. Patient denies any complaints. Patient denies changes to PMH and medication. Patient tolerates exercise without complaint.      Weekly education; Discussed DASH diet (Dietary Approaches to Stop Hypertension). Sample DASH Menu, tips for using the DASH plan, how to divide your plate as a guide, serving sizes, and label reading/sodium content. Handout provided. Patient reports he only consumes one meal per day, CR staff asked patient to compare daily intake to DASH recommendations and to follow up with staff for further review. Patient in agreement.       All equipment used in the care for this patient has been cleaned

## 2024-04-10 ENCOUNTER — HOSPITAL ENCOUNTER (OUTPATIENT)
Dept: CARDIAC REHAB | Age: 64
Setting detail: THERAPIES SERIES
Discharge: HOME | End: 2024-04-10
Payer: COMMERCIAL

## 2024-04-10 PROCEDURE — 93798 PHYS/QHP OP CAR RHAB W/ECG: CPT

## 2024-04-10 NOTE — CARDIO/PULMONARY
Patient arrives to cardiac rehab for session. Covid screening complete. Patient denies any complaints. Patient denies changes to PMH and medication. Patient tolerates exercise without complaint, including independent weight session.    All equipment used in the care for this patient has been cleaned.  Electronically signed by Graciela Collins RN on 4/10/2024 at 3:30 PM

## 2024-04-12 ENCOUNTER — HOSPITAL ENCOUNTER (OUTPATIENT)
Dept: CARDIAC REHAB | Age: 64
Setting detail: THERAPIES SERIES
Discharge: HOME | End: 2024-04-12
Payer: COMMERCIAL

## 2024-04-12 PROCEDURE — 93798 PHYS/QHP OP CAR RHAB W/ECG: CPT

## 2024-04-12 NOTE — PROGRESS NOTES
Patient arrives to cardiac rehab for session. Covid screening complete. Patient denies any complaints. Patient denies changes to PMH and medication. Patient tolerates exercise without complaint.   Patient's mother passed away today.   Patient will be out of town next week.  Electronically signed by Marisol Anaya RN on 4/12/2024 at 3:34 PM

## 2024-04-15 ENCOUNTER — APPOINTMENT (OUTPATIENT)
Dept: CARDIAC REHAB | Age: 64
End: 2024-04-15
Payer: COMMERCIAL

## 2024-04-17 ENCOUNTER — APPOINTMENT (OUTPATIENT)
Dept: CARDIAC REHAB | Age: 64
End: 2024-04-17
Payer: COMMERCIAL

## 2024-04-19 ENCOUNTER — APPOINTMENT (OUTPATIENT)
Dept: CARDIAC REHAB | Age: 64
End: 2024-04-19
Payer: COMMERCIAL

## 2024-04-22 ENCOUNTER — HOSPITAL ENCOUNTER (OUTPATIENT)
Dept: CARDIAC REHAB | Age: 64
Setting detail: THERAPIES SERIES
Discharge: HOME | End: 2024-04-22
Payer: COMMERCIAL

## 2024-04-22 ASSESSMENT — EJECTION FRACTION: EF_VALUE: 25

## 2024-04-22 ASSESSMENT — LIFESTYLE VARIABLES
SMOKELESS_TOBACCO: NO
CIGARETTES_PER_DAY: 1 PPD

## 2024-04-22 ASSESSMENT — EXERCISE STRESS TEST: PEAK_BP: 124/62

## 2024-04-22 NOTE — CARDIO/PULMONARY
[facility] Cardiac Rehab ITP Note      Patient Name: Slade Landeros  MRN: [id number]                  : 1960      ITP Note: re-assessment    Patient completed  sessions of cardiac rehab.     Past Medical History:   Diagnosis Date    CAD (coronary artery disease)     CHF (congestive heart failure) (HCC)     History of PTCA     Tobacco abuse           Current Outpatient Medications   Medication Sig Dispense Refill    clopidogrel (PLAVIX) 75 MG tablet Take 1 tablet by mouth daily 90 tablet 3    metoprolol succinate (TOPROL XL) 50 MG extended release tablet Take 1 tablet by mouth daily 90 tablet 3    sacubitril-valsartan (ENTRESTO) 24-26 MG per tablet Take 1 tablet by mouth 2 times daily 180 tablet 3    bumetanide (BUMEX) 2 MG tablet Take 1 tablet by mouth daily 90 tablet 3    spironolactone (ALDACTONE) 25 MG tablet Take 1 tablet by mouth daily 90 tablet 3    nitroGLYCERIN (NITROSTAT) 0.4 MG SL tablet Place 1 tablet under the tongue every 5 minutes as needed for Chest pain up to max of 3 total doses. If no relief after 1 dose, call 911. 25 tablet 3    aspirin 81 MG EC tablet Take 1 tablet by mouth daily 90 tablet 1    atorvastatin (LIPITOR) 80 MG tablet Take 1 tablet by mouth daily 90 tablet 3    magnesium oxide (MAG-OX) 400 MG tablet Take 1 tablet by mouth daily      Multiple Vitamin (MULTIVITAMIN) capsule Take 1 capsule by mouth      potassium chloride (KLOR-CON M) 20 MEQ TBCR extended release tablet Take 1 tablet by mouth daily       No current facility-administered medications for this encounter.        Labs:   Lipid panel: 2022  Total: 163  Tri  HDL: 46  LDL: 101      HgbA1c: 2023  7.8        Ejection Fraction & Date: 3/6/24 Echo EF 25% per recent cardiology notes          [facility] Activity Status Index: 7.25         [facility]: 16         PHQ9: 4         Falls Risk: 0         Cardiac Knowledge Pre- Test: 9         Rate Your Plate: 56            Wyatt Dawkins  2024

## 2024-04-29 ENCOUNTER — HOSPITAL ENCOUNTER (OUTPATIENT)
Dept: CARDIAC REHAB | Age: 64
Setting detail: THERAPIES SERIES
Discharge: HOME | End: 2024-04-29
Payer: COMMERCIAL

## 2024-04-29 PROCEDURE — 93798 PHYS/QHP OP CAR RHAB W/ECG: CPT

## 2024-04-29 NOTE — CARDIO/PULMONARY
Patient arrives to cardiac rehab for session. Covid screening complete. Patient denies any complaints. Patient denies changes to PMH and medication. Patient tolerates exercise without complaint.     Weekly Education; discussed sleep apnea, what it is (failure to breathe while you sleep), how it is diagnosed (seeing physician, testing in sleep lab, home sleep test), treatment (CPAP, lifestyle changes, weight loss, oral appliances, surgical procedures), benefits of treatment, and why it is harmful . BANG screen and [place] Sleepiness Scale added to information. Brochure given to patient.     All equipment used in the care for this patient has been cleaned.  Electronically signed by Graciela Collins RN on 4/29/2024 at 3:23 PM

## 2024-05-01 ENCOUNTER — HOSPITAL ENCOUNTER (OUTPATIENT)
Age: 64
Discharge: HOME | End: 2024-05-03
Attending: INTERNAL MEDICINE
Payer: COMMERCIAL

## 2024-05-01 ENCOUNTER — HOSPITAL ENCOUNTER (OUTPATIENT)
Dept: CARDIAC REHAB | Age: 64
Setting detail: THERAPIES SERIES
Discharge: HOME | End: 2024-05-01
Payer: COMMERCIAL

## 2024-05-01 VITALS
BODY MASS INDEX: 33.53 KG/M2 | DIASTOLIC BLOOD PRESSURE: 72 MMHG | SYSTOLIC BLOOD PRESSURE: 128 MMHG | WEIGHT: 253 LBS | HEIGHT: 73 IN

## 2024-05-01 DIAGNOSIS — I25.5 ISCHEMIC CARDIOMYOPATHY: ICD-10-CM

## 2024-05-01 LAB
ECHO AO ROOT DIAM: 3 CM
ECHO AO ROOT INDEX: 1.27 CM/M2
ECHO AR MAX VEL PISA: 3.9 M/S
ECHO AV AREA PEAK VELOCITY: 1.6 CM2
ECHO AV AREA PEAK VELOCITY: 1.7 CM2
ECHO AV AREA PEAK VELOCITY: 2.2 CM2
ECHO AV AREA PEAK VELOCITY: 2.3 CM2
ECHO AV AREA VTI: 2.2 CM2
ECHO AV AREA/BSA VTI: 0.9 CM2/M2
ECHO AV CUSP MM: 2.3 CM
ECHO AV MEAN GRADIENT: 8 MMHG
ECHO AV MEAN VELOCITY: 1.4 M/S
ECHO AV PEAK GRADIENT: 12 MMHG
ECHO AV PEAK GRADIENT: 13 MMHG
ECHO AV PEAK VELOCITY: 1.8 M/S
ECHO AV PEAK VELOCITY: 1.8 M/S
ECHO AV REGURGITANT PHT: 517.4 MILLISECOND
ECHO AV VTI: 40.9 CM
ECHO BSA: 2.43 M2
ECHO EST RA PRESSURE: 3 MMHG
ECHO LA DIAMETER INDEX: 1.73 CM/M2
ECHO LA DIAMETER: 4.1 CM
ECHO LA TO AORTIC ROOT RATIO: 1.37
ECHO LA VOL A-L A2C: 82 ML (ref 18–58)
ECHO LA VOL A-L A4C: 85 ML (ref 18–58)
ECHO LA VOL MOD A2C: 78 ML (ref 18–58)
ECHO LA VOL MOD A4C: 78 ML (ref 18–58)
ECHO LA VOLUME AREA LENGTH: 84 ML
ECHO LA VOLUME INDEX A-L A2C: 35 ML/M2 (ref 16–34)
ECHO LA VOLUME INDEX A-L A4C: 36 ML/M2 (ref 16–34)
ECHO LA VOLUME INDEX AREA LENGTH: 35 ML/M2 (ref 16–34)
ECHO LA VOLUME INDEX MOD A2C: 33 ML/M2 (ref 16–34)
ECHO LA VOLUME INDEX MOD A4C: 33 ML/M2 (ref 16–34)
ECHO LV E' LATERAL VELOCITY: 11 CM/S
ECHO LV E' SEPTAL VELOCITY: 7 CM/S
ECHO LV EDV A2C: 172 ML
ECHO LV EDV A4C: 215 ML
ECHO LV EDV BP: 192 ML (ref 67–155)
ECHO LV EDV INDEX A4C: 91 ML/M2
ECHO LV EDV INDEX BP: 81 ML/M2
ECHO LV EDV NDEX A2C: 73 ML/M2
ECHO LV EJECTION FRACTION A2C: 46 %
ECHO LV EJECTION FRACTION A4C: 46 %
ECHO LV EJECTION FRACTION BIPLANE: 46 % (ref 55–100)
ECHO LV ESV A2C: 92 ML
ECHO LV ESV A4C: 117 ML
ECHO LV ESV BP: 104 ML (ref 22–58)
ECHO LV ESV INDEX A2C: 39 ML/M2
ECHO LV ESV INDEX A4C: 49 ML/M2
ECHO LV ESV INDEX BP: 44 ML/M2
ECHO LV FRACTIONAL SHORTENING: 21 % (ref 28–44)
ECHO LV INTERNAL DIMENSION DIASTOLE INDEX: 2.45 CM/M2
ECHO LV INTERNAL DIMENSION DIASTOLIC: 5.8 CM (ref 4.2–5.9)
ECHO LV INTERNAL DIMENSION SYSTOLIC INDEX: 1.94 CM/M2
ECHO LV INTERNAL DIMENSION SYSTOLIC: 4.6 CM
ECHO LV IVSD: 1.2 CM (ref 0.6–1)
ECHO LV IVSS: 1.4 CM
ECHO LV MASS 2D: 314 G (ref 88–224)
ECHO LV MASS INDEX 2D: 132.5 G/M2 (ref 49–115)
ECHO LV POSTERIOR WALL DIASTOLIC: 1.3 CM (ref 0.6–1)
ECHO LV POSTERIOR WALL SYSTOLIC: 1.6 CM
ECHO LV RELATIVE WALL THICKNESS RATIO: 0.45
ECHO LVOT AREA: 3.5 CM2
ECHO LVOT AV VTI INDEX: 0.62
ECHO LVOT DIAM: 2.1 CM
ECHO LVOT MEAN GRADIENT: 3 MMHG
ECHO LVOT PEAK GRADIENT: 3 MMHG
ECHO LVOT PEAK GRADIENT: 5 MMHG
ECHO LVOT PEAK VELOCITY: 0.8 M/S
ECHO LVOT PEAK VELOCITY: 1.1 M/S
ECHO LVOT STROKE VOLUME INDEX: 37.1 ML/M2
ECHO LVOT SV: 87.9 ML
ECHO LVOT VTI: 25.4 CM
ECHO MV A VELOCITY: 0.98 M/S
ECHO MV AREA VTI: 2.6 CM2
ECHO MV E DECELERATION TIME (DT): 179.7 MS
ECHO MV E VELOCITY: 0.86 M/S
ECHO MV E/A RATIO: 0.88
ECHO MV E/E' LATERAL: 7.82
ECHO MV E/E' RATIO (AVERAGED): 10.05
ECHO MV LVOT VTI INDEX: 1.33
ECHO MV MAX VELOCITY: 1.1 M/S
ECHO MV MEAN GRADIENT: 2 MMHG
ECHO MV MEAN VELOCITY: 0.6 M/S
ECHO MV PEAK GRADIENT: 5 MMHG
ECHO MV REGURGITANT PEAK GRADIENT: 108 MMHG
ECHO MV REGURGITANT PEAK VELOCITY: 5.2 M/S
ECHO MV VTI: 33.7 CM
ECHO PV MAX VELOCITY: 0.9 M/S
ECHO PV PEAK GRADIENT: 3 MMHG
ECHO RIGHT VENTRICULAR SYSTOLIC PRESSURE (RVSP): 49 MMHG
ECHO RV TAPSE: 2.7 CM (ref 1.7–?)
ECHO RVOT PEAK GRADIENT: 2 MMHG
ECHO RVOT PEAK VELOCITY: 0.7 M/S
ECHO TV REGURGITANT MAX VELOCITY: 3.4 M/S
ECHO TV REGURGITANT PEAK GRADIENT: 46 MMHG

## 2024-05-01 PROCEDURE — 93306 TTE W/DOPPLER COMPLETE: CPT

## 2024-05-01 PROCEDURE — 93798 PHYS/QHP OP CAR RHAB W/ECG: CPT

## 2024-05-01 NOTE — CARDIO/PULMONARY
Patient arrives to cardiac rehab for session. Covid screening complete. Patient denies any complaints. Patient denies changes to PMH and medication. Patient tolerates exercise without complaint, including independent weight session.     Patient had a repeat Echo performed prior to his session today.     All equipment used in the care for this patient has been cleaned.  Electronically signed by Graciela Collins RN on 5/1/2024 at 3:53 PM

## 2024-05-03 ENCOUNTER — HOSPITAL ENCOUNTER (OUTPATIENT)
Dept: CARDIAC REHAB | Age: 64
Setting detail: THERAPIES SERIES
Discharge: HOME | End: 2024-05-03
Payer: COMMERCIAL

## 2024-05-03 PROCEDURE — 93798 PHYS/QHP OP CAR RHAB W/ECG: CPT

## 2024-05-03 NOTE — CARDIO/PULMONARY
Patient arrives to cardiac rehab for session. Covid screening complete. Patient denies any complaints. Patient denies changes to PMH and medication. Patient tolerates exercise without complaint, including independent weight session.     Discussed discharge paperwork, patient to return prior to discharge.     All equipment used in the care for this patient has been cleaned.  Electronically signed by Graciela Collins RN on 5/3/2024 at 2:23 PM

## 2024-05-06 ENCOUNTER — TELEPHONE (OUTPATIENT)
Dept: CARDIOLOGY CLINIC | Age: 64
End: 2024-05-06

## 2024-05-06 ENCOUNTER — HOSPITAL ENCOUNTER (OUTPATIENT)
Dept: CARDIAC REHAB | Age: 64
Setting detail: THERAPIES SERIES
Discharge: HOME | End: 2024-05-06
Payer: COMMERCIAL

## 2024-05-06 DIAGNOSIS — Z95.5 STATUS POST CORONARY ARTERY STENT PLACEMENT: Primary | ICD-10-CM

## 2024-05-06 PROCEDURE — 93798 PHYS/QHP OP CAR RHAB W/ECG: CPT

## 2024-05-06 ASSESSMENT — PATIENT HEALTH QUESTIONNAIRE - PHQ9
SUM OF ALL RESPONSES TO PHQ9 QUESTIONS 1 & 2: 0
2. FEELING DOWN, DEPRESSED OR HOPELESS: NOT AT ALL
6. FEELING BAD ABOUT YOURSELF - OR THAT YOU ARE A FAILURE OR HAVE LET YOURSELF OR YOUR FAMILY DOWN: NOT AT ALL
3. TROUBLE FALLING OR STAYING ASLEEP: NOT AT ALL
SUM OF ALL RESPONSES TO PHQ QUESTIONS 1-9: 0
9. THOUGHTS THAT YOU WOULD BE BETTER OFF DEAD, OR OF HURTING YOURSELF: NOT AT ALL
10. IF YOU CHECKED OFF ANY PROBLEMS, HOW DIFFICULT HAVE THESE PROBLEMS MADE IT FOR YOU TO DO YOUR WORK, TAKE CARE OF THINGS AT HOME, OR GET ALONG WITH OTHER PEOPLE: NOT DIFFICULT AT ALL
1. LITTLE INTEREST OR PLEASURE IN DOING THINGS: NOT AT ALL
4. FEELING TIRED OR HAVING LITTLE ENERGY: NOT AT ALL
8. MOVING OR SPEAKING SO SLOWLY THAT OTHER PEOPLE COULD HAVE NOTICED. OR THE OPPOSITE, BEING SO FIGETY OR RESTLESS THAT YOU HAVE BEEN MOVING AROUND A LOT MORE THAN USUAL: NOT AT ALL
SUM OF ALL RESPONSES TO PHQ QUESTIONS 1-9: 0
5. POOR APPETITE OR OVEREATING: NOT AT ALL
SUM OF ALL RESPONSES TO PHQ QUESTIONS 1-9: 0
7. TROUBLE CONCENTRATING ON THINGS, SUCH AS READING THE NEWSPAPER OR WATCHING TELEVISION: NOT AT ALL
SUM OF ALL RESPONSES TO PHQ QUESTIONS 1-9: 0

## 2024-05-06 NOTE — CARDIO/PULMONARY
*LAST DAY*  Patient arrives to cardiac rehab for session. Covid screening complete. Patient denies any complaints. Patient denies changes to PMH and medication. Patient tolerates exercise without complaint.     Patient completed 6 minute walk test with 11 laps (1820 ft) which is an increase of 4 laps from initial walk test.     Patient plans to continue exercising. He has a membership to [employer] and also has a phase 3 cardiac rehab order available when he gets his car back , potentially next month. Patient aware of signs and symptoms to monitor for, who to report them to, and when to call 911/go to ER.    Weekly Education; Discussed what is obesity, risk factors that it is linked to (heart disease, HTN, high cholesterol, DM, HF, CVA), BMI (Body Mass Index), what you can do to decrease your BMI (increase your activity, eat fewer calories, get enough sleep, work with health care team). Handout provided.     All equipment used in the care for this patient has been cleaned.  Electronically signed by Graciela Collins RN on 5/6/2024 at 3:05 PM

## 2024-05-06 NOTE — TELEPHONE ENCOUNTER
----- Message from Graciela Collins RN sent at 5/3/2024  4:00 PM EDT -----  Regarding: phase 3  Stefanie Hardin,     Patient has almost completed phase 2 cardiac rehab and is progressing well! He would like to continue with phase 3. Can we please have an order (indicating phase 3 in the notes section please).     Thank you,   [facility] cardiac rehab  Graciela JAIN

## 2024-05-07 ENCOUNTER — PREP FOR PROCEDURE (OUTPATIENT)
Dept: CARDIOLOGY CLINIC | Age: 64
End: 2024-05-07

## 2024-05-07 ENCOUNTER — OFFICE VISIT (OUTPATIENT)
Dept: CARDIOLOGY CLINIC | Age: 64
End: 2024-05-07
Payer: COMMERCIAL

## 2024-05-07 VITALS
WEIGHT: 253 LBS | HEART RATE: 81 BPM | HEIGHT: 73 IN | RESPIRATION RATE: 15 BRPM | DIASTOLIC BLOOD PRESSURE: 68 MMHG | BODY MASS INDEX: 33.53 KG/M2 | OXYGEN SATURATION: 96 % | SYSTOLIC BLOOD PRESSURE: 112 MMHG

## 2024-05-07 DIAGNOSIS — I10 HYPERTENSION, UNSPECIFIED TYPE: ICD-10-CM

## 2024-05-07 DIAGNOSIS — I50.43 CHF (CONGESTIVE HEART FAILURE), NYHA CLASS I, ACUTE ON CHRONIC, COMBINED (HCC): ICD-10-CM

## 2024-05-07 DIAGNOSIS — I42.9 CARDIOMYOPATHY, UNSPECIFIED TYPE (HCC): ICD-10-CM

## 2024-05-07 DIAGNOSIS — I25.5 ISCHEMIC CARDIOMYOPATHY: ICD-10-CM

## 2024-05-07 DIAGNOSIS — I35.1 NONRHEUMATIC AORTIC VALVE INSUFFICIENCY: ICD-10-CM

## 2024-05-07 DIAGNOSIS — E78.5 DYSLIPIDEMIA: ICD-10-CM

## 2024-05-07 DIAGNOSIS — I35.1 NONRHEUMATIC AORTIC VALVE INSUFFICIENCY: Primary | ICD-10-CM

## 2024-05-07 DIAGNOSIS — Z95.5 STATUS POST CORONARY ARTERY STENT PLACEMENT: Primary | ICD-10-CM

## 2024-05-07 LAB
ANION GAP SERPL CALCULATED.3IONS-SCNC: 11 MEQ/L (ref 9–15)
BUN SERPL-MCNC: 17 MG/DL (ref 8–23)
CALCIUM SERPL-MCNC: 9.7 MG/DL (ref 8.5–9.9)
CHLORIDE SERPL-SCNC: 99 MEQ/L (ref 95–107)
CO2 SERPL-SCNC: 26 MEQ/L (ref 20–31)
CREAT SERPL-MCNC: 1.23 MG/DL (ref 0.7–1.2)
ERYTHROCYTE [DISTWIDTH] IN BLOOD BY AUTOMATED COUNT: 14.7 % (ref 11.5–14.5)
GLUCOSE SERPL-MCNC: 161 MG/DL (ref 70–99)
HCT VFR BLD AUTO: 44.8 % (ref 42–52)
HGB BLD-MCNC: 14.2 G/DL (ref 14–18)
MCH RBC QN AUTO: 26.1 PG (ref 27–31.3)
MCHC RBC AUTO-ENTMCNC: 31.7 % (ref 33–37)
MCV RBC AUTO: 82.4 FL (ref 79–92.2)
PLATELET # BLD AUTO: 318 K/UL (ref 130–400)
POTASSIUM SERPL-SCNC: 4.1 MEQ/L (ref 3.4–4.9)
RBC # BLD AUTO: 5.44 M/UL (ref 4.7–6.1)
SODIUM SERPL-SCNC: 136 MEQ/L (ref 135–144)
WBC # BLD AUTO: 8.9 K/UL (ref 4.8–10.8)

## 2024-05-07 PROCEDURE — 3074F SYST BP LT 130 MM HG: CPT | Performed by: INTERNAL MEDICINE

## 2024-05-07 PROCEDURE — 99215 OFFICE O/P EST HI 40 MIN: CPT | Performed by: INTERNAL MEDICINE

## 2024-05-07 PROCEDURE — 3078F DIAST BP <80 MM HG: CPT | Performed by: INTERNAL MEDICINE

## 2024-05-07 RX ORDER — SODIUM CHLORIDE 0.9 % (FLUSH) 0.9 %
5-40 SYRINGE (ML) INJECTION EVERY 12 HOURS SCHEDULED
Status: CANCELLED | OUTPATIENT
Start: 2024-05-07

## 2024-05-07 RX ORDER — SODIUM CHLORIDE 9 MG/ML
INJECTION, SOLUTION INTRAVENOUS PRN
Status: CANCELLED | OUTPATIENT
Start: 2024-05-07

## 2024-05-07 RX ORDER — SODIUM CHLORIDE 0.9 % (FLUSH) 0.9 %
5-40 SYRINGE (ML) INJECTION PRN
Status: CANCELLED | OUTPATIENT
Start: 2024-05-07

## 2024-05-07 ASSESSMENT — ENCOUNTER SYMPTOMS
NAUSEA: 0
EYES NEGATIVE: 1
SHORTNESS OF BREATH: 0
COUGH: 0
GASTROINTESTINAL NEGATIVE: 1
STRIDOR: 0
CHEST TIGHTNESS: 0
WHEEZING: 0
BLOOD IN STOOL: 0
RESPIRATORY NEGATIVE: 1

## 2024-05-07 NOTE — PROGRESS NOTES
OFFICE VISIT         Patient: Slade Landeros  YOB: 1960  MRN: [id number]    Chief Complaint:  CHF  Chief Complaint   Patient presents with    Results     Of Echo.        CV Data:  11/22 Echo - [facility] LV Dilated EF 10-15  11/23 Echo 25-30 RVSP 74 AR mild Mod   12/8/23 Cath  LVEF 30% EDP 14 LAD mid 70%. CX OMB distal 80% RCA sequential Px and Mid 80%   12/19/23 RCA KIMBERLEY x2   3/6/24 Echo EF 25-25%   5/24 Echo EF 40% Mod Severe AR  RVSP 49    Subjective/HPI  referred for CHF.  Pt moved from [place]. Pt apparently has reduced LVEF and has had CHF.  He is on appropriate HF meds at this time he is compensated with no sob nor cp. No edema     No cath    11/22/23 some estrada no cp no falls no bleed takes meds.     1/29/24 recent ER for atypical CP no so bno falsll no bleed takes meds now going to gym. Still smokes    3/6/24 pt due to low BP stopped most meds to included BB and ENtrest.  Even though BP was low he was not dizzy. No cp no sob he exercises all the tiime f/u Echo EF 25-30    5/7/24 feels great no sonb no cp no falls no bleed doing well w exercise. Now compliant w meds.       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     Past Medical History:   Diagnosis Date    CAD (coronary artery disease)     CHF (congestive heart failure) ([facility])     History of PTCA     Tobacco abuse        Past Surgical History:   Procedure Laterality Date    CARDIAC PROCEDURE N/A 12/8/2023    Left heart cath / coronary angiography/ via the Right Radial Artery performed by Shankar Hardin [place] [facility]    CARDIAC PROCEDURE N/A 12/8/2023    Percutaneous coronary intervention performed by Derek Meredith MD [place] [facility]    CARDIAC PROCEDURE N/A 12/19/2023    Percutaneous coronary intervention performed by Derek Meredith MD at [facility]    XR MIDLINE EQUAL OR GREATER THAN 5 YEARS  6/6/2014    XR MIDLINE EQUAL OR GREATER THAN 5 YEARS 6/6/2014

## 2024-05-08 ASSESSMENT — LIFESTYLE VARIABLES
SMOKELESS_TOBACCO: NO
CIGARETTES_PER_DAY: 1 PPD

## 2024-05-08 ASSESSMENT — EXERCISE STRESS TEST
PEAK_BP: 138/70
PEAK_HR: 126
PEAK_RPE: 11
PEAK_RPD: 1
PEAK_BP: 142/78

## 2024-05-08 ASSESSMENT — EJECTION FRACTION: EF_VALUE: 40

## 2024-05-08 ASSESSMENT — NEW YORK HEART ASSOCIATION (NYHA) CLASSIFICATION: NYHA FUNCTIONAL CLASS: NO NYHA CLASS OR UNABLE TO DETERMINE

## 2024-05-08 NOTE — PROGRESS NOTES
[facility] Cardiac Rehab ITP Note      Patient Name: Slade Landeros  MRN: [id number]                  : 1960      ITP Note: discharge    Patient completed 35/35 sessions of cardiac rehab. Patient used 1 exercise session for an 1:1 with RD.    Past Medical History:   Diagnosis Date    CAD (coronary artery disease)     CHF (congestive heart failure) (HCC)     History of PTCA     Tobacco abuse           Current Outpatient Medications   Medication Sig Dispense Refill    empagliflozin (JARDIANCE) 10 MG tablet Take 1 tablet by mouth daily 90 tablet 3    clopidogrel (PLAVIX) 75 MG tablet Take 1 tablet by mouth daily 90 tablet 3    metoprolol succinate (TOPROL XL) 50 MG extended release tablet Take 1 tablet by mouth daily 90 tablet 3    sacubitril-valsartan (ENTRESTO) 24-26 MG per tablet Take 1 tablet by mouth 2 times daily 180 tablet 3    bumetanide (BUMEX) 2 MG tablet Take 1 tablet by mouth daily 90 tablet 3    spironolactone (ALDACTONE) 25 MG tablet Take 1 tablet by mouth daily 90 tablet 3    nitroGLYCERIN (NITROSTAT) 0.4 MG SL tablet Place 1 tablet under the tongue every 5 minutes as needed for Chest pain up to max of 3 total doses. If no relief after 1 dose, call 911. 25 tablet 3    aspirin 81 MG EC tablet Take 1 tablet by mouth daily 90 tablet 1    atorvastatin (LIPITOR) 80 MG tablet Take 1 tablet by mouth daily 90 tablet 3    magnesium oxide (MAG-OX) 400 MG tablet Take 1 tablet by mouth daily      Multiple Vitamin (MULTIVITAMIN) capsule Take 1 capsule by mouth      potassium chloride (KLOR-CON M) 20 MEQ TBCR extended release tablet Take 1 tablet by mouth daily       No current facility-administered medications for this encounter.        Labs:   Recent Labs     24  1339   GLUCOSE 161*     Labs:    Lipid panel: 2022   Total: 163   Tri   HDL: 46   LDL: 101        HgbA1c: 2023   7.8     Ejection Fraction & Date: 40-45% 2024 Echo    [facility] Activity Status Index:  Can You

## 2024-05-13 ENCOUNTER — TELEPHONE (OUTPATIENT)
Dept: CARDIOLOGY CLINIC | Age: 64
End: 2024-05-13

## 2024-05-14 ENCOUNTER — HOSPITAL ENCOUNTER (OUTPATIENT)
Age: 64
Discharge: HOME | End: 2024-05-14
Attending: INTERNAL MEDICINE | Admitting: INTERNAL MEDICINE
Payer: COMMERCIAL

## 2024-05-14 ENCOUNTER — APPOINTMENT (OUTPATIENT)
Dept: ULTRASOUND IMAGING | Age: 64
End: 2024-05-14
Attending: INTERNAL MEDICINE
Payer: COMMERCIAL

## 2024-05-14 ENCOUNTER — APPOINTMENT (OUTPATIENT)
Age: 64
End: 2024-05-14
Attending: INTERNAL MEDICINE
Payer: COMMERCIAL

## 2024-05-14 ENCOUNTER — TELEPHONE (OUTPATIENT)
Dept: CARDIOLOGY CLINIC | Age: 64
End: 2024-05-14

## 2024-05-14 VITALS
HEIGHT: 73 IN | WEIGHT: 253.09 LBS | DIASTOLIC BLOOD PRESSURE: 60 MMHG | HEART RATE: 55 BPM | TEMPERATURE: 97.8 F | BODY MASS INDEX: 33.54 KG/M2 | OXYGEN SATURATION: 98 % | RESPIRATION RATE: 21 BRPM | SYSTOLIC BLOOD PRESSURE: 123 MMHG

## 2024-05-14 DIAGNOSIS — Z01.810 PREOP CARDIOVASCULAR EXAM: ICD-10-CM

## 2024-05-14 DIAGNOSIS — I35.1 NONRHEUMATIC AORTIC VALVE INSUFFICIENCY: ICD-10-CM

## 2024-05-14 DIAGNOSIS — I35.1 NONRHEUMATIC AORTIC VALVE INSUFFICIENCY: Primary | ICD-10-CM

## 2024-05-14 DIAGNOSIS — R93.89 ABNORMAL CAROTID ULTRASOUND: Primary | ICD-10-CM

## 2024-05-14 DIAGNOSIS — I42.9 MYOCARDIOPATHY (HCC): Primary | ICD-10-CM

## 2024-05-14 DIAGNOSIS — I42.9 CARDIOMYOPATHY (HCC): ICD-10-CM

## 2024-05-14 LAB
ECHO AR MAX VEL PISA: 4.9 M/S
ECHO AV REGURGITANT PHT: 177.4 MILLISECOND
ECHO BSA: 2.43 M2
ECHO BSA: 2.43 M2
VAS LEFT CCA DIST EDV: 13 CM/S
VAS LEFT CCA DIST PSV: 97.5 CM/S
VAS LEFT CCA MID EDV: 16.2 CM/S
VAS LEFT CCA MID PSV: 96.3 CM/S
VAS LEFT CCA PROX EDV: 13.7 CM/S
VAS LEFT CCA PROX PSV: 114 CM/S
VAS LEFT ECA EDV: 19.3 CM/S
VAS LEFT ECA PSV: 122 CM/S
VAS LEFT ICA DIST EDV: 20.4 CM/S
VAS LEFT ICA DIST PSV: 88.7 CM/S
VAS LEFT ICA MID EDV: 39.2 CM/S
VAS LEFT ICA MID PSV: 148 CM/S
VAS LEFT ICA PROX EDV: 32.1 CM/S
VAS LEFT ICA PROX PSV: 138 CM/S
VAS LEFT ICA/CCA PSV: 1.54
VAS LEFT VERTEBRAL EDV: 25.1 CM/S
VAS LEFT VERTEBRAL PSV: 113 CM/S
VAS RIGHT CCA DIST EDV: 7.4 CM/S
VAS RIGHT CCA DIST PSV: 49.1 CM/S
VAS RIGHT CCA MID EDV: 9.94 CM/S
VAS RIGHT CCA MID PSV: 69.6 CM/S
VAS RIGHT CCA PROX EDV: 3.1 CM/S
VAS RIGHT CCA PROX PSV: 87 CM/S
VAS RIGHT ECA EDV: 8.62 CM/S
VAS RIGHT ECA PSV: 129 CM/S
VAS RIGHT ICA DIST EDV: 9 CM/S
VAS RIGHT ICA DIST PSV: 36.1 CM/S
VAS RIGHT ICA MID EDV: 72.1 CM/S
VAS RIGHT ICA MID PSV: 307 CM/S
VAS RIGHT ICA PROX EDV: 9.7 CM/S
VAS RIGHT ICA PROX PSV: 39.1 CM/S
VAS RIGHT ICA/CCA PSV: 4.39
VAS RIGHT VERTEBRAL EDV: 8.62 CM/S
VAS RIGHT VERTEBRAL PSV: 29.4 CM/S

## 2024-05-14 PROCEDURE — 93880 EXTRACRANIAL BILAT STUDY: CPT

## 2024-05-14 PROCEDURE — 6370000000 HC RX 637 (ALT 250 FOR IP): Performed by: INTERNAL MEDICINE

## 2024-05-14 PROCEDURE — 93880 EXTRACRANIAL BILAT STUDY: CPT | Performed by: INTERNAL MEDICINE

## 2024-05-14 PROCEDURE — 93312 ECHO TRANSESOPHAGEAL: CPT | Performed by: INTERNAL MEDICINE

## 2024-05-14 PROCEDURE — 6360000002 HC RX W HCPCS: Performed by: INTERNAL MEDICINE

## 2024-05-14 PROCEDURE — 7100000010 HC PHASE II RECOVERY - FIRST 15 MIN: Performed by: INTERNAL MEDICINE

## 2024-05-14 PROCEDURE — 93325 DOPPLER ECHO COLOR FLOW MAPG: CPT | Performed by: INTERNAL MEDICINE

## 2024-05-14 PROCEDURE — 99152 MOD SED SAME PHYS/QHP 5/>YRS: CPT | Performed by: INTERNAL MEDICINE

## 2024-05-14 PROCEDURE — 93320 DOPPLER ECHO COMPLETE: CPT | Performed by: INTERNAL MEDICINE

## 2024-05-14 PROCEDURE — 93320 DOPPLER ECHO COMPLETE: CPT

## 2024-05-14 PROCEDURE — 7100000011 HC PHASE II RECOVERY - ADDTL 15 MIN: Performed by: INTERNAL MEDICINE

## 2024-05-14 RX ORDER — MIDAZOLAM HYDROCHLORIDE 1 MG/ML
INJECTION INTRAMUSCULAR; INTRAVENOUS PRN
Status: COMPLETED | OUTPATIENT
Start: 2024-05-14 | End: 2024-05-14

## 2024-05-14 RX ORDER — LIDOCAINE HYDROCHLORIDE 20 MG/ML
SOLUTION OROPHARYNGEAL PRN
Status: COMPLETED | OUTPATIENT
Start: 2024-05-14 | End: 2024-05-14

## 2024-05-14 RX ORDER — SODIUM CHLORIDE 9 MG/ML
INJECTION, SOLUTION INTRAVENOUS PRN
Status: DISCONTINUED | OUTPATIENT
Start: 2024-05-14 | End: 2024-05-14 | Stop reason: HOSPADM

## 2024-05-14 RX ORDER — SODIUM CHLORIDE 0.9 % (FLUSH) 0.9 %
5-40 SYRINGE (ML) INJECTION EVERY 12 HOURS SCHEDULED
Status: DISCONTINUED | OUTPATIENT
Start: 2024-05-14 | End: 2024-05-14 | Stop reason: HOSPADM

## 2024-05-14 RX ORDER — SODIUM CHLORIDE 9 MG/ML
INJECTION, SOLUTION INTRAVENOUS CONTINUOUS
Status: DISCONTINUED | OUTPATIENT
Start: 2024-05-14 | End: 2024-05-14 | Stop reason: HOSPADM

## 2024-05-14 RX ORDER — SODIUM CHLORIDE 0.9 % (FLUSH) 0.9 %
5-40 SYRINGE (ML) INJECTION PRN
Status: DISCONTINUED | OUTPATIENT
Start: 2024-05-14 | End: 2024-05-14 | Stop reason: HOSPADM

## 2024-05-14 RX ORDER — FENTANYL CITRATE 50 UG/ML
INJECTION, SOLUTION INTRAMUSCULAR; INTRAVENOUS PRN
Status: COMPLETED | OUTPATIENT
Start: 2024-05-14 | End: 2024-05-14

## 2024-05-14 RX ADMIN — LIDOCAINE HYDROCHLORIDE 10 ML: 20 SOLUTION ORAL; TOPICAL at 09:33

## 2024-05-14 RX ADMIN — MIDAZOLAM 1 MG: 1 INJECTION, SOLUTION INTRAMUSCULAR; INTRAVENOUS at 09:36

## 2024-05-14 RX ADMIN — FENTANYL CITRATE 25 MCG: 50 INJECTION, SOLUTION INTRAMUSCULAR; INTRAVENOUS at 09:35

## 2024-05-14 RX ADMIN — FENTANYL CITRATE 50 MCG: 50 INJECTION, SOLUTION INTRAMUSCULAR; INTRAVENOUS at 09:32

## 2024-05-14 RX ADMIN — MIDAZOLAM 2 MG: 1 INJECTION, SOLUTION INTRAMUSCULAR; INTRAVENOUS at 09:33

## 2024-05-14 NOTE — BRIEF OP NOTE
Brief Postoperative Note      Patient: Slade Landeros  YOB: 1960  MRN: [id number]     Section of Cardiology  Adult Brief Procedure Note        Procedure(s):  JOSUÉ with bubble study    Pre-operative Diagnosis:  AR    H&P Status: Completed and reviewed.     Post-operative Diagnosis:  LVEF 20% Severe AR. Moderate MR Moderate TR. RVSP 50 mmHg. No PFO no Thrombus. Moderate Aortic atherosclerosis.     Findings: see full report    Complications:  none    Primary Proceduralist:   Shankar Hardin MD

## 2024-05-14 NOTE — PROGRESS NOTES
Report received from JA/RN post hallie procedure. VSS and monitored. Patient resting comfortably at this time with no needs.

## 2024-05-14 NOTE — PROGRESS NOTES
TRANSFER - OUT REPORT:    Verbal report given to Víctor Acuna(name) on Slade Landeros being transferred to Pre/post cath lab holding bay #7(unit) for routine progression of patient care       Report consisted of patient's Situation, Background, Assessment and   Recommendations(SBAR).     Information from the following report(s) Event Log was reviewed with the receiving nurse.    Opportunity for questions and clarification was provided.      Patient transported with:   Registered Nurse

## 2024-05-14 NOTE — PROGRESS NOTES
TRANSFER - IN REPORT:    Verbal report received from Víctor Acuna on Slade Landeros  being received from pre/post cath lab holding(unit) for ordered procedure      Report consisted of patient’s Situation, Background, Assessment and   Recommendations(SBAR).     Information from the following report(s) Procedure Verification was reviewed with the receiving nurse.    Opportunity for questions and clarification was provided.      Assessment completed upon patient’s arrival to unit and care assume Pt is A&OX4, verbal and calm and cooperative sitting up in bed. Denies any current complaints at this time.

## 2024-05-14 NOTE — PROGRESS NOTES
Patient arrived from OP waiting room ambulatory. VSS, peripheral IV's obtained. Informed consent obtained with questions and concerns answered with understanding verbalized. Patient prepped for procedure.

## 2024-05-14 NOTE — PROGRESS NOTES
TRANSFER - OUT REPORT:    Verbal report given to Víctor Acuna on Slade Landeros being transferred to pre/post cath lab(unit) for routine progression of patient care       Report consisted of patient's Situation, Background, Assessment and   Recommendations(SBAR).     Information from the following report(s) Event Log was reviewed with the receiving nurse.    Opportunity for questions and clarification was provided.      Patient transported with:   Registered Nurse

## 2024-05-14 NOTE — PROGRESS NOTES
Gag reflux checked. Positive. Provided PO fluids/food. Peripheral IVs removed, and patient is getting dressed. He will call family for a ride home. Discharge instructions reviewed with understanding verbalized.

## 2024-05-21 ENCOUNTER — HOSPITAL ENCOUNTER (OUTPATIENT)
Dept: CT IMAGING | Age: 64
Discharge: HOME | End: 2024-05-23
Attending: INTERNAL MEDICINE
Payer: COMMERCIAL

## 2024-05-21 DIAGNOSIS — R93.89 ABNORMAL CAROTID ULTRASOUND: ICD-10-CM

## 2024-05-21 PROCEDURE — 70498 CT ANGIOGRAPHY NECK: CPT

## 2024-05-21 PROCEDURE — 6360000004 HC RX CONTRAST MEDICATION: Performed by: INTERNAL MEDICINE

## 2024-05-21 RX ADMIN — IOPAMIDOL 75 ML: 755 INJECTION, SOLUTION INTRAVENOUS at 08:09

## 2024-05-29 ENCOUNTER — TELEPHONE (OUTPATIENT)
Dept: CARDIOLOGY CLINIC | Age: 64
End: 2024-05-29

## 2024-05-29 ENCOUNTER — OFFICE VISIT (OUTPATIENT)
Dept: CARDIOLOGY CLINIC | Age: 64
End: 2024-05-29
Payer: COMMERCIAL

## 2024-05-29 VITALS
DIASTOLIC BLOOD PRESSURE: 74 MMHG | HEART RATE: 68 BPM | WEIGHT: 255 LBS | SYSTOLIC BLOOD PRESSURE: 132 MMHG | BODY MASS INDEX: 33.65 KG/M2

## 2024-05-29 DIAGNOSIS — I35.1 NONRHEUMATIC AORTIC VALVE INSUFFICIENCY: Primary | ICD-10-CM

## 2024-05-29 DIAGNOSIS — I42.9 CARDIOMYOPATHY, UNSPECIFIED TYPE (HCC): ICD-10-CM

## 2024-05-29 DIAGNOSIS — I10 HYPERTENSION, UNSPECIFIED TYPE: ICD-10-CM

## 2024-05-29 DIAGNOSIS — E78.5 DYSLIPIDEMIA: ICD-10-CM

## 2024-05-29 DIAGNOSIS — I65.21 CAROTID STENOSIS, ASYMPTOMATIC, RIGHT: ICD-10-CM

## 2024-05-29 DIAGNOSIS — Z95.5 STATUS POST CORONARY ARTERY STENT PLACEMENT: ICD-10-CM

## 2024-05-29 DIAGNOSIS — I25.5 ISCHEMIC CARDIOMYOPATHY: ICD-10-CM

## 2024-05-29 PROCEDURE — 99214 OFFICE O/P EST MOD 30 MIN: CPT | Performed by: INTERNAL MEDICINE

## 2024-05-29 PROCEDURE — 3075F SYST BP GE 130 - 139MM HG: CPT | Performed by: INTERNAL MEDICINE

## 2024-05-29 PROCEDURE — 3078F DIAST BP <80 MM HG: CPT | Performed by: INTERNAL MEDICINE

## 2024-05-29 ASSESSMENT — ENCOUNTER SYMPTOMS
WHEEZING: 0
CHEST TIGHTNESS: 0
SHORTNESS OF BREATH: 0
NAUSEA: 0
STRIDOR: 0
EYES NEGATIVE: 1
RESPIRATORY NEGATIVE: 1
COUGH: 0
GASTROINTESTINAL NEGATIVE: 1
BLOOD IN STOOL: 0

## 2024-05-29 NOTE — PROGRESS NOTES
OFFICE VISIT         Patient: Slade Landeros  YOB: 1960  MRN: [id number]    Chief Complaint:  CHF  Chief Complaint   Patient presents with    Coronary Artery Disease    Cardiomyopathy       CV Data:  11/22 Echo - [facility] LV Dilated EF 10-15  11/23 Echo 25-30 RVSP 74 AR mild Mod   12/8/23 Cath  LVEF 30% EDP 14 LAD mid 70%. CX OMB distal 80% RCA sequential Px and Mid 80% -- s/p LAD KIMBERLEY  12/19/23 RCA KIMBERLEY x2.  CX opted for med Rx.   3/6/24 Echo EF 25-25% AR  5/24 Echo EF 40% Mod Severe AR  RVSP 49  5/14/24 JOSUÉ EF 20 Severe AR moderate MR   5/24 CUS JACOB >70  5/24/24 CTA Neck  JACOB 90%.       Subjective/HPI  referred for CHF.  Pt moved from [place], [place]. Pt apparently has reduced LVEF and has had CHF.  He is on appropriate HF meds at this time he is compensated with no sob nor cp. No edema     No cath    11/22/23 some estrada no cp no falls no bleed takes meds.     1/29/24 recent ER for atypical CP no so bno falsll no bleed takes meds now going to gym. Still smokes    3/6/24 pt due to low BP stopped most meds to included BB and ENtrest.  Even though BP was low he was not dizzy. No cp no sob he exercises all the tiime f/u Echo EF 25-30    5/7/24 feels great no sonb no cp no falls no bleed doing well w exercise. Now compliant w meds.     5/29/24 did well w JOSUÉ. No cp no sob no falls no bleed. No cerebral symptoms.       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     Past Medical History:   Diagnosis Date    CAD (coronary artery disease)     CHF (congestive heart failure) ([facility])     History of PTCA     Tobacco abuse        Past Surgical History:   Procedure Laterality Date    CARDIAC PROCEDURE N/A 12/8/2023    Left heart cath / coronary angiography/ via the Right Radial Artery performed by Shankar [place]in [place] [facility]    CARDIAC PROCEDURE N/A 12/8/2023    Percutaneous coronary intervention performed by Derek Meredith MD [place] [facility]

## 2024-05-29 NOTE — TELEPHONE ENCOUNTER
Patient has follow up with Dr. Hardin today, 05/29/2024 145PM.     ----- Message from Shankar RIVERA MD sent at 5/29/2024  9:39 AM EDT -----  Right Carotid has 90% Blockage.  We can address this after his Valve surgery  ----- Message -----  From: Julieta Dumont MA  Sent: 5/28/2024   4:54 PM EDT  To: Shankar RIVERA MD    Appt tomorrow to discuss further   ----- Message -----  From: Shankar Hardin MD  Sent: 5/24/2024   3:31 PM EDT  To: [facility] Clinical Staff    Right Carotid artery is stenosed as well. We will address this after Valve surgery  ----- Message -----  From: Rosalva Rolle Incoming Radiant Results From Echograph/Pacs  Sent: 5/24/2024   2:44 PM EDT  To: Shankar RIVERA MD

## 2025-01-30 ENCOUNTER — APPOINTMENT (OUTPATIENT)
Dept: DERMATOLOGY | Facility: CLINIC | Age: 65
End: 2025-01-30
Payer: MEDICARE

## 2025-01-30 DIAGNOSIS — L73.2 HIDRADENITIS SUPPURATIVA: Primary | ICD-10-CM

## 2025-01-30 PROCEDURE — 99204 OFFICE O/P NEW MOD 45 MIN: CPT | Performed by: NURSE PRACTITIONER

## 2025-01-30 RX ORDER — DOXYCYCLINE 100 MG/1
100 CAPSULE ORAL 2 TIMES DAILY
Qty: 60 CAPSULE | Refills: 1 | Status: SHIPPED | OUTPATIENT
Start: 2025-01-30 | End: 2025-03-31

## 2025-01-30 RX ORDER — CALCIUM CARB, CITRATE, MALATE 250 MG
90 CAPSULE ORAL DAILY
Qty: 90 CAPSULE | Refills: 2 | Status: SHIPPED | OUTPATIENT
Start: 2025-01-30

## 2025-01-30 RX ORDER — CLINDAMYCIN PHOSPHATE 10 UG/ML
LOTION TOPICAL 2 TIMES DAILY
Qty: 60 ML | Refills: 1 | Status: SHIPPED | OUTPATIENT
Start: 2025-01-30

## 2025-01-30 NOTE — PROGRESS NOTES
Subjective     Jesse Newsome is a 65 y.o. male who presents for the following: Hidradenitis Suppurativa.   New patient in for hidradenitis suppurativa to buttock patient states history of hidradenitis suppurativa to axilla and groin, longstanding.  Patient states surgical removal of tract to axilla and groin no problems to area since.  Patient states surgery to coccyx x 2 for debridement.  Patient is currently doing saline packing, blue foam in the past is use Mesalt.  Patient denies any topical or oral medications for hidradenitis suppurativa at this time.    Review of Systems:  No other skin or systemic complaints other than what is documented elsewhere in the note.    The following portions of the chart were reviewed this encounter and updated as appropriate:  Allergies       Skin Cancer History  No skin cancer on file.    Specialty Problems    None    Past Medical History:  Jesse Newsome  has no past medical history on file.    Past Surgical History:  Jesse Newsome  has no past surgical history on file.    Family History:  Patient family history is not on file.    Social History:  Jesse Newsome  has no history on file for tobacco use, alcohol use, and drug use.    Allergies:  Patient has no known allergies.    Current Medications / CAM's:    Current Outpatient Medications:     clindamycin (Cleocin T) 1 % lotion, Apply topically 2 times a day., Disp: 60 mL, Rfl: 1    doxycycline (Monodox) 100 mg capsule, Take 1 capsule (100 mg) by mouth 2 times a day. Take with at least 8 ounces (large glass) of water, do not lie down for 30 minutes after, Disp: 60 capsule, Rfl: 1    zinc glycinate 30 mg capsule, Take 90 mg by mouth once daily., Disp: 90 capsule, Rfl: 2     Objective   Well appearing patient in no apparent distress; mood and affect are within normal limits.      Assessment/Plan   1. Hidradenitis suppurativa  Gluteal Crease  Sacral wound clean from HS surgery without evidence of infection. Entire right buttock area is  moist. All tissues pink.     Wound 10/19/23 Incision Buttock Right (Active)     Patient continues cleanse wound with normal saline and pat dry.  Apply aquacel AG rope wound bed as primary dressing.  Cover with 5x9 ABD as secondary dressing.    Right buttock wound:  Cleanse wound with normal saline and pat dry  Pack with aquacel Ag rope to wound bed.   Cover with a 5x9 ABD as secondary dressing    -Discussed nature of condition  -Discussed treatment options  -Patient encourage to discontinue smoking, his trying to stop  -Recommend:    clindamycin (Cleocin T) 1 % lotion - Gluteal Crease  Apply topically 2 times a day.    doxycycline (Monodox) 100 mg capsule - Gluteal Crease  Take 1 capsule (100 mg) by mouth 2 times a day. Take with at least 8 ounces (large glass) of water, do not lie down for 30 minutes after    zinc glycinate 30 mg capsule - Gluteal Crease  Take 90 mg by mouth once daily.    Related Procedures  Follow Up In Dermatology - Established Patient

## 2025-02-13 ENCOUNTER — TELEPHONE (OUTPATIENT)
Dept: DERMATOLOGY | Facility: CLINIC | Age: 65
End: 2025-02-13
Payer: MEDICARE

## 2025-02-13 ENCOUNTER — HOSPITAL ENCOUNTER (OUTPATIENT)
Dept: SLEEP CENTER | Age: 65
Discharge: HOME | End: 2025-02-15
Payer: COMMERCIAL

## 2025-02-13 DIAGNOSIS — L73.2 HIDRADENITIS SUPPURATIVA: Primary | ICD-10-CM

## 2025-02-13 PROCEDURE — 95810 POLYSOM 6/> YRS 4/> PARAM: CPT

## 2025-02-13 RX ORDER — CLINDAMYCIN PHOSPHATE 10 UG/ML
LOTION TOPICAL DAILY
Qty: 120 ML | Refills: 3 | Status: SHIPPED | OUTPATIENT
Start: 2025-02-13

## 2025-02-13 NOTE — TELEPHONE ENCOUNTER
"Patient called and LM that he is out of his \"ointment\" I am thinking it is the clindamycin lotion that was sent 1/30/2025 for HS. Pharmacy will not fill until it has reached 30 days since last . Is there something else he can use in the meantime?   "

## 2025-02-14 NOTE — TELEPHONE ENCOUNTER
Pt LVM inquiring if he should continue using Clindamycin and if so, he will need a refill.  Returned call and LVM notifying him that a new prescription was sent in yesterday will 3 refills and to call/MyChart with any further questions or concerns.  Sugey Mora LPN

## 2025-03-10 ENCOUNTER — OFFICE VISIT (OUTPATIENT)
Dept: PULMONOLOGY | Age: 65
End: 2025-03-10
Payer: MEDICARE

## 2025-03-10 VITALS
BODY MASS INDEX: 32.86 KG/M2 | OXYGEN SATURATION: 97 % | WEIGHT: 249 LBS | HEART RATE: 94 BPM | DIASTOLIC BLOOD PRESSURE: 80 MMHG | SYSTOLIC BLOOD PRESSURE: 116 MMHG

## 2025-03-10 DIAGNOSIS — G47.33 OSA (OBSTRUCTIVE SLEEP APNEA): Primary | ICD-10-CM

## 2025-03-10 DIAGNOSIS — E66.9 OBESITY (BMI 30-39.9): ICD-10-CM

## 2025-03-10 PROCEDURE — G8427 DOCREV CUR MEDS BY ELIG CLIN: HCPCS | Performed by: INTERNAL MEDICINE

## 2025-03-10 PROCEDURE — G8417 CALC BMI ABV UP PARAM F/U: HCPCS | Performed by: INTERNAL MEDICINE

## 2025-03-10 PROCEDURE — 99204 OFFICE O/P NEW MOD 45 MIN: CPT | Performed by: INTERNAL MEDICINE

## 2025-03-10 PROCEDURE — 4004F PT TOBACCO SCREEN RCVD TLK: CPT | Performed by: INTERNAL MEDICINE

## 2025-03-10 PROCEDURE — 3017F COLORECTAL CA SCREEN DOC REV: CPT | Performed by: INTERNAL MEDICINE

## 2025-03-10 RX ORDER — CALCIUM CARBONATE/VITAMIN D3 600 MG-10
3 TABLET ORAL DAILY
COMMUNITY
Start: 2025-01-31

## 2025-03-10 ASSESSMENT — ENCOUNTER SYMPTOMS
VOMITING: 0
EYE ITCHING: 0
RHINORRHEA: 0
CHEST TIGHTNESS: 0
ABDOMINAL PAIN: 0
WHEEZING: 0
VOICE CHANGE: 0
NAUSEA: 0
SORE THROAT: 0
DIARRHEA: 0
SHORTNESS OF BREATH: 0
COUGH: 0

## 2025-03-10 NOTE — PROGRESS NOTES
Not on file   Tobacco Use    Smoking status: Every Day     Current packs/day: 0.50     Types: Cigarettes    Smokeless tobacco: Never   Substance and Sexual Activity    Alcohol use: Not Currently    Drug use: Never    Sexual activity: Not on file   Other Topics Concern    Not on file   Social History Narrative    Not on file     Social Drivers of Health     Financial Resource Strain: Not on file   Food Insecurity: Not on file   Transportation Needs: Not on file   Physical Activity: Not on file   Stress: Not on file   Social Connections: Not on file   Intimate Partner Violence: Not on file   Housing Stability: Not on file         Review of Systems   Constitutional:  Negative for chills, diaphoresis, fatigue and fever.   HENT:  Negative for congestion, mouth sores, nosebleeds, postnasal drip, rhinorrhea, sneezing, sore throat and voice change.    Eyes:  Negative for itching and visual disturbance.   Respiratory:  Negative for cough, chest tightness, shortness of breath and wheezing.    Cardiovascular: Negative.  Negative for chest pain, palpitations and leg swelling.   Gastrointestinal:  Negative for abdominal pain, diarrhea, nausea and vomiting.   Genitourinary:  Negative for difficulty urinating and hematuria.   Musculoskeletal:  Negative for arthralgias, joint swelling and myalgias.   Skin:  Negative for rash.   Allergic/Immunologic: Negative for environmental allergies.   Neurological:  Negative for dizziness, tremors, weakness and headaches.   Psychiatric/Behavioral:  Positive for sleep disturbance. Negative for behavioral problems.          :     Vitals:    03/10/25 1136   BP: 116/80   Pulse: 94   SpO2: 97%   Weight: 112.9 kg (249 lb)     Wt Readings from Last 3 Encounters:   03/10/25 112.9 kg (249 lb)   05/29/24 115.7 kg (255 lb)   05/14/24 114.8 kg (253 lb 1.4 oz)         Physical Exam  Constitutional:       Appearance: He is well-developed. He is obese.   HENT:      Head: Normocephalic and atraumatic.

## 2025-03-23 ENCOUNTER — HOSPITAL ENCOUNTER (OUTPATIENT)
Dept: SLEEP CENTER | Age: 65
Discharge: HOME | End: 2025-03-25
Payer: MEDICARE

## 2025-03-23 PROCEDURE — 95811 POLYSOM 6/>YRS CPAP 4/> PARM: CPT

## 2025-03-26 ENCOUNTER — APPOINTMENT (OUTPATIENT)
Dept: DERMATOLOGY | Facility: CLINIC | Age: 65
End: 2025-03-26
Payer: MEDICARE

## 2025-03-26 DIAGNOSIS — L73.2 HIDRADENITIS SUPPURATIVA: ICD-10-CM

## 2025-03-26 PROCEDURE — 99213 OFFICE O/P EST LOW 20 MIN: CPT | Performed by: NURSE PRACTITIONER

## 2025-03-26 RX ORDER — DOXYCYCLINE 100 MG/1
100 CAPSULE ORAL 2 TIMES DAILY
Qty: 60 CAPSULE | Refills: 3 | Status: SHIPPED | OUTPATIENT
Start: 2025-03-26 | End: 2025-07-24

## 2025-03-26 RX ORDER — CLINDAMYCIN PHOSPHATE 10 UG/ML
LOTION TOPICAL 2 TIMES DAILY
Qty: 120 ML | Refills: 3 | Status: SHIPPED | OUTPATIENT
Start: 2025-03-26 | End: 2025-03-26 | Stop reason: WASHOUT

## 2025-03-26 NOTE — PROGRESS NOTES
Subjective     Jesse Newsome is a 65 y.o. male who presents for the following: Hidradenitis Suppurativa.   Established patient in for follow up last seen 01/2025  and prescribed to take doxycycline 100mg BID, zinc glycinate 30mg capsule, and clindamycin 1% lotion.   Wound incision right buttock treatment -normal saline and pat dry, Apply aquacel AG rope wound bed as primary dressing and cover with 5x9 ABD as secondary dressing.    Patient states that he ran out of clindamycin lotion 1% after one month and finished the doxycycline last week and continues the zinc as well as the dressing as above to the right buttock. Patient states that he has seen no changes.    Review of Systems:  No other skin or systemic complaints other than what is documented elsewhere in the note.    The following portions of the chart were reviewed this encounter and updated as appropriate:       Skin Cancer History  No skin cancer on file.    Specialty Problems    None    Past Medical History:  Jesse Newsome  has no past medical history on file.    Past Surgical History:  Jesse Newsome  has no past surgical history on file.    Family History:  Patient family history is not on file.    Social History:  Jesse Newsome  has no history on file for tobacco use, alcohol use, and drug use.    Allergies:  Patient has no known allergies.    Current Medications / CAM's:    Current Outpatient Medications:     clindamycin (Cleocin T) 1 % lotion, Apply topically once daily., Disp: 120 mL, Rfl: 3    doxycycline (Monodox) 100 mg capsule, Take 1 capsule (100 mg) by mouth 2 times a day. Take with at least 8 ounces (large glass) of water, do not lie down for 30 minutes after, Disp: 60 capsule, Rfl: 3    zinc glycinate 30 mg capsule, Take 90 mg by mouth once daily., Disp: 90 capsule, Rfl: 2     Objective   Well appearing patient in no apparent distress; mood and affect are within normal limits.      Assessment/Plan   1. Hidradenitis suppurativa  Gluteal  Crease  Surgical wound in gluteal fold healing well without any evidence of infection.  He states drainage is present, serosanguinous (based on exam today) and seems to be reducing. No new lesions since our last visit.     -Discussed nature of condition  -Discussed treatment options  -Discussed/information given on the potential adverse effects of rx Doxycycline, including but not limited to, GI upset (nausea, vomiting, diarrhea), photosensitivity (and the need to wear SPF and avoid prolonged outdoor exposure), esophagitis (the patient is to take the medication with food & water and to remain upright for 1 hour after taking medication), and headaches.   -Doxycycline may may be taken with food to avoid GI upset; if taking with milk, multivitamins or antacids, the absorption of Doxycycline may be decreased but this is usually not an issue when treating common dermatologic conditions.  -Recommend the shortest appropriate course of oral antibiotics to reduce the chance of antibiotic resistance among bacteria.  -For patients of child-bearing potential, discussed that patient should not become pregnant while taking this medication as it can cause damage to the infant's teeth and other issues during pregnancy  -Patient encourage to discontinue smoking, his trying to stop  -Recommend:    Related Procedures  Follow Up In Dermatology - Established Patient    Related Medications  zinc glycinate 30 mg capsule  Take 90 mg by mouth once daily.    clindamycin (Cleocin T) 1 % lotion  Apply topically once daily.    doxycycline (Monodox) 100 mg capsule  Take 1 capsule (100 mg) by mouth 2 times a day. Take with at least 8 ounces (large glass) of water, do not lie down for 30 minutes after

## 2025-04-06 PROBLEM — G47.33 OSA (OBSTRUCTIVE SLEEP APNEA): Status: ACTIVE | Noted: 2025-04-06

## 2025-04-07 ENCOUNTER — OFFICE VISIT (OUTPATIENT)
Age: 65
End: 2025-04-07
Payer: MEDICARE

## 2025-04-07 VITALS
HEART RATE: 73 BPM | SYSTOLIC BLOOD PRESSURE: 120 MMHG | WEIGHT: 253 LBS | BODY MASS INDEX: 33.39 KG/M2 | DIASTOLIC BLOOD PRESSURE: 76 MMHG | OXYGEN SATURATION: 99 %

## 2025-04-07 DIAGNOSIS — E66.9 OBESITY (BMI 30-39.9): ICD-10-CM

## 2025-04-07 DIAGNOSIS — G47.33 OSA (OBSTRUCTIVE SLEEP APNEA): Primary | ICD-10-CM

## 2025-04-07 PROCEDURE — 3017F COLORECTAL CA SCREEN DOC REV: CPT | Performed by: INTERNAL MEDICINE

## 2025-04-07 PROCEDURE — G8417 CALC BMI ABV UP PARAM F/U: HCPCS | Performed by: INTERNAL MEDICINE

## 2025-04-07 PROCEDURE — 99214 OFFICE O/P EST MOD 30 MIN: CPT | Performed by: INTERNAL MEDICINE

## 2025-04-07 PROCEDURE — 4004F PT TOBACCO SCREEN RCVD TLK: CPT | Performed by: INTERNAL MEDICINE

## 2025-04-07 PROCEDURE — G8427 DOCREV CUR MEDS BY ELIG CLIN: HCPCS | Performed by: INTERNAL MEDICINE

## 2025-04-07 ASSESSMENT — ENCOUNTER SYMPTOMS
VOMITING: 0
RHINORRHEA: 0
NAUSEA: 0
COUGH: 0
SORE THROAT: 0
VOICE CHANGE: 0
DIARRHEA: 0
EYE ITCHING: 0
CHEST TIGHTNESS: 0
SHORTNESS OF BREATH: 0
ABDOMINAL PAIN: 0
WHEEZING: 0

## 2025-04-07 NOTE — PROGRESS NOTES
Not Currently    Drug use: Never    Sexual activity: Not on file   Other Topics Concern    Not on file   Social History Narrative    Not on file     Social Drivers of Health     Financial Resource Strain: Not on file   Food Insecurity: Not on file   Transportation Needs: Not on file   Physical Activity: Not on file   Stress: Not on file   Social Connections: Not on file   Intimate Partner Violence: Not on file   Housing Stability: Not on file         Review of Systems   Constitutional:  Negative for chills, diaphoresis, fatigue and fever.   HENT:  Negative for congestion, mouth sores, nosebleeds, postnasal drip, rhinorrhea, sneezing, sore throat and voice change.    Eyes:  Negative for itching and visual disturbance.   Respiratory:  Negative for cough, chest tightness, shortness of breath and wheezing.    Cardiovascular: Negative.  Negative for chest pain, palpitations and leg swelling.   Gastrointestinal:  Negative for abdominal pain, diarrhea, nausea and vomiting.   Genitourinary:  Negative for difficulty urinating and hematuria.   Musculoskeletal:  Negative for arthralgias, joint swelling and myalgias.   Skin:  Negative for rash.   Allergic/Immunologic: Negative for environmental allergies.   Neurological:  Negative for dizziness, tremors, weakness and headaches.   Psychiatric/Behavioral:  Positive for sleep disturbance. Negative for behavioral problems.          :     Vitals:    04/07/25 1039   BP: 120/76   BP Site: Right Upper Arm   Patient Position: Sitting   BP Cuff Size: Medium Adult   Pulse: 73   SpO2: 99%   Weight: 114.8 kg (253 lb)     Wt Readings from Last 3 Encounters:   04/07/25 114.8 kg (253 lb)   03/10/25 112.9 kg (249 lb)   05/29/24 115.7 kg (255 lb)         Physical Exam  Constitutional:       Appearance: He is well-developed.   HENT:      Head: Normocephalic and atraumatic.      Nose: Nose normal.   Eyes:      Conjunctiva/sclera: Conjunctivae normal.      Pupils: Pupils are equal, round, and

## 2025-06-04 ENCOUNTER — OFFICE VISIT (OUTPATIENT)
Age: 65
End: 2025-06-04
Payer: MEDICARE

## 2025-06-04 VITALS
DIASTOLIC BLOOD PRESSURE: 76 MMHG | OXYGEN SATURATION: 98 % | BODY MASS INDEX: 32.73 KG/M2 | SYSTOLIC BLOOD PRESSURE: 110 MMHG | HEART RATE: 84 BPM | WEIGHT: 248 LBS

## 2025-06-04 DIAGNOSIS — G47.33 OSA (OBSTRUCTIVE SLEEP APNEA): Primary | ICD-10-CM

## 2025-06-04 DIAGNOSIS — E66.9 OBESITY (BMI 30-39.9): ICD-10-CM

## 2025-06-04 PROCEDURE — G8417 CALC BMI ABV UP PARAM F/U: HCPCS | Performed by: INTERNAL MEDICINE

## 2025-06-04 PROCEDURE — 4004F PT TOBACCO SCREEN RCVD TLK: CPT | Performed by: INTERNAL MEDICINE

## 2025-06-04 PROCEDURE — 3017F COLORECTAL CA SCREEN DOC REV: CPT | Performed by: INTERNAL MEDICINE

## 2025-06-04 PROCEDURE — G8427 DOCREV CUR MEDS BY ELIG CLIN: HCPCS | Performed by: INTERNAL MEDICINE

## 2025-06-04 PROCEDURE — 99214 OFFICE O/P EST MOD 30 MIN: CPT | Performed by: INTERNAL MEDICINE

## 2025-06-04 NOTE — PROGRESS NOTES
Subjective:             Slade Landeros is a 65 y.o. male who complains today of:     Chief Complaint   Patient presents with    Follow-up     8wk f/u on SONIA       HPI  Patient is using BiPAP with  I 21 and E  15 centimeters of H2O with heated humidity.  He is using BiPAP for about  5-6   hours every night.  He is using BiPAP with full face   mask  He said  sleep is restful with the BiPAP use.  No snoring with BiPAP use  No early morning headache.    No complaint of daytime sleepiness or tiredness with BiPAP use  He denies taking naps.  No sleepiness with driving  He denies difficulty falling asleep or staying asleep.    I reviewed compliance report with patient regarding BIPAP therapy. She is using BIPAP for 30 days out of 30 days  Average usage of days used is 5 hours and 17 min , average AHI 15.1 with BIPAP use.        Allergies:  Monosodium glutamate  Past Medical History:   Diagnosis Date    CAD (coronary artery disease)     CHF (congestive heart failure) ([facility])     History of PTCA     Tobacco abuse      Past Surgical History:   Procedure Laterality Date    CARDIAC PROCEDURE N/A 12/8/2023    Left heart cath / coronary angiography/ via the Right Radial Artery performed by Shankar Hardin MD at [facility] [place]    CARDIAC PROCEDURE N/A 12/8/2023    Percutaneous coronary intervention performed by Derek Meredith MD at [facility]ARDIAC CATH LAB    CARDIAC PROCEDURE N/A 12/19/2023    Percutaneous coronary intervention performed by Derek Meredith MD at [facility]ARDIAC CATH LAB    XR MIDLINE EQUAL OR GREATER THAN 5 YEARS  6/6/2014    XR MIDLINE EQUAL OR GREATER THAN 5 YEARS 6/6/2014    XR MIDLINE EQUAL OR GREATER THAN 5 YEARS  8/27/2013    XR MIDLINE EQUAL OR GREATER THAN 5 YEARS 8/27/2013     No family history on file.  Social History     Socioeconomic History    Marital status:      Spouse name: Not on file    Number of children: Not on file    Years of education: Not on file    Highest education level:

## 2025-06-13 ENCOUNTER — APPOINTMENT (OUTPATIENT)
Dept: DERMATOLOGY | Facility: CLINIC | Age: 65
End: 2025-06-13
Payer: MEDICARE

## 2025-06-13 DIAGNOSIS — L73.2 HIDRADENITIS SUPPURATIVA: ICD-10-CM

## 2025-06-13 PROCEDURE — 99213 OFFICE O/P EST LOW 20 MIN: CPT | Performed by: NURSE PRACTITIONER

## 2025-06-13 NOTE — PROGRESS NOTES
Virtual or Telephone Consent    An interactive audio and video telecommunication system which permits real time communications between the patient (at the originating site) and provider (at the distant site) was utilized to provide this telehealth service.   Verbal consent was requested and obtained from Jesse Newsome on this date, 06/13/25 for a telehealth visit and the patient's location was confirmed at the time of the visit.    Subjective     Jesse Newsome is a 65 y.o. male who presents for the following: Hidradenitis Suppurativa.   Established patient in for virtual follow up last seen 03/2025 and prescribed to zinc, doxycycline and clindamycin 1% lotion.       Review of Systems:  No other skin or systemic complaints other than what is documented elsewhere in the note.    The following portions of the chart were reviewed this encounter and updated as appropriate:       Skin Cancer History  Biopsy Log Book  No skin cancers from Specimen Tracking.    Additional History      Specialty Problems    None    Past Medical History:  Jesse Newsome  has no past medical history on file.    Past Surgical History:  Jesse Newsome  has no past surgical history on file.    Family History:  Patient family history is not on file.    Social History:  Jesse Newsome  has no history on file for tobacco use, alcohol use, and drug use.    Allergies:  Patient has no known allergies.    Current Medications / CAM's:  Current Medications[1]     Objective   Well appearing patient in no apparent distress; mood and affect are within normal limits.      Assessment/Plan   Skin Exam  1. HIDRADENITIS SUPPURATIVA  Gluteal Crease  65 year-old male with a history of severe, refractory HS involving the gluteal and sacrococcygeal regions. He previously underwent surgical intervention at [facility] with ongoing wound care follow-up. At the time of surgical treatment, the patient had not been managed with systemic or topical medical therapy  for HS. Since initiating dermatologic care in January 2025, he was started on doxycycline 100 mg BID, topical clindamycin 1% lotion BID, and benzoyl peroxide wash daily. He reports no new nodules or abscesses since starting treatment, indicating disease quiescence. However, he continues to experience daily serosanguinous drainage from the surgical sites.    Objective:  Sacral wound is improving with continued drainage.  Right buttock wound shows minimal improvement per wound care.    Dressing orders are active for wet-to-dry dressings (sacrum) and Aquacel Ag rope packing (buttock), with offloading.    Assessment:  This is a case of severe HS with prior surgical de-raul and current wound care needs. While lesions have not recurred on medical therapy, the chronicity and severity of existing wounds and drainage highlight the need for escalation beyond standard oral antibiotics and topical agents.      PLAN:  -Continue current HS regimen (started 1/2025):  -Doxycycline 100 mg PO BID  -Clindamycin 1% lotion BID  -Benzoyl peroxide wash once daily  -Zinc 90mg daily     Clinical trial referral:  -Patient referred today for HS clinical trial consideration due to severe, refractory nature and chronic drainage.  -Await eligibility and enrollment outcome.  -Consider escalation to biologic therapy (e.g., adalimumab, IL-17 or IL-1 inhibitors) if not enrolled in trial or if excluded.    EDUCATION:  -Reviewed the chronic, relapsing nature of HS and the importance of adherence to medical and wound care regimens.  -Discussed realistic expectations for wound healing and scarring.  Related Procedures  Follow Up In Dermatology - Established Patient  CBC and Auto Differential  Hepatitis B Surface Antigen  Hepatitis B Surface Antibody  Hepatitis B Core Antibody, Total  T-Spot TB  HIV 1/2 Antigen/Antibody Screen with Reflex to Confirmation  Related Medications  zinc glycinate 30 mg capsule  Take 90 mg by mouth once daily.  clindamycin  (Cleocin T) 1 % lotion  Apply topically once daily.  doxycycline (Monodox) 100 mg capsule  Take 1 capsule (100 mg) by mouth 2 times a day. Take with at least 8 ounces (large glass) of water, do not lie down for 30 minutes after            [1]   Current Outpatient Medications:     clindamycin (Cleocin T) 1 % lotion, Apply topically once daily., Disp: 120 mL, Rfl: 3    doxycycline (Monodox) 100 mg capsule, Take 1 capsule (100 mg) by mouth 2 times a day. Take with at least 8 ounces (large glass) of water, do not lie down for 30 minutes after, Disp: 60 capsule, Rfl: 3    zinc glycinate 30 mg capsule, Take 90 mg by mouth once daily., Disp: 90 capsule, Rfl: 2

## 2025-06-13 NOTE — Clinical Note
65 year-old male with a history of severe, refractory HS involving the gluteal and sacrococcygeal regions. He previously underwent surgical intervention at [facility] ([facility]) with ongoing wound care follow-up. At the time of surgical treatment, the patient had not been managed with systemic or topical medical therapy for HS. Since initiating dermatologic care in January 2025, he was started on doxycycline 100 mg BID, topical clindamycin 1% lotion BID, and benzoyl peroxide wash daily. He reports no new nodules or abscesses since starting treatment, indicating disease quiescence. However, he continues to experience daily serosanguinous drainage from the surgical sites.    Objective:  Sacral wound is improving with continued drainage.  Right buttock wound shows minimal improvement per wound care.    Dressing orders are active for wet-to-dry dressings (sacrum) and Aquacel Ag rope packing (buttock), with offloading.    Assessment:  This is a case of severe HS with prior surgical de-raul and current wound care needs. While lesions have not recurred on medical therapy, the chronicity and severity of existing wounds and drainage highlight the need for escalation beyond standard oral antibiotics and topical agents.

## 2025-06-13 NOTE — Clinical Note
PLAN:  -Continue current HS regimen (started 1/2025):  -Doxycycline 100 mg PO BID  -Clindamycin 1% lotion BID  -Benzoyl peroxide wash once daily  -Zinc 90mg daily     Clinical trial referral:  -Patient referred today for HS clinical trial consideration due to severe, refractory nature and chronic drainage.  -Await eligibility and enrollment outcome.  -Consider escalation to biologic therapy (e.g., adalimumab, IL-17 or IL-1 inhibitors) if not enrolled in trial or if excluded.    EDUCATION:  -Reviewed the chronic, relapsing nature of HS and the importance of adherence to medical and wound care regimens.  -Discussed realistic expectations for wound healing and scarring.

## 2025-07-15 ENCOUNTER — TELEPHONE (OUTPATIENT)
Dept: DERMATOLOGY | Facility: CLINIC | Age: 65
End: 2025-07-15
Payer: MEDICARE

## 2025-07-15 NOTE — TELEPHONE ENCOUNTER
Patient called left message stating that he has not heard from the research team in regards to his at bedtime.  Please advise next steps.

## 2025-08-18 ENCOUNTER — TELEPHONE (OUTPATIENT)
Dept: DERMATOLOGY | Facility: CLINIC | Age: 65
End: 2025-08-18
Payer: MEDICARE

## 2025-08-19 ENCOUNTER — SPECIALTY PHARMACY (OUTPATIENT)
Dept: PHARMACY | Facility: CLINIC | Age: 65
End: 2025-08-19

## (undated) DEVICE — GOWN,AURORA,NONREINFORCED,LARGE: Brand: MEDLINE

## (undated) DEVICE — GLOVE SURG SZ 6 THK91MIL LTX FREE SYN POLYISOPRENE ANTI

## (undated) DEVICE — GUIDEWIRE VASC L260CM DIA0.035IN TIP L3MM STD EXCHG PTFE J

## (undated) DEVICE — ELECTRODE,RADIOTRANSLUCENT,FOAM,3PK: Brand: MEDLINE

## (undated) DEVICE — GOWN,SIRUS,NONRNF,SETINSLV,XL,20/CS: Brand: MEDLINE

## (undated) DEVICE — CUFF BLD PRSS AD CLTH SGL TB W/ BAYNT CONN ROUNDED CORNER

## (undated) DEVICE — DEVICE COMPR LNG 27 CM VASC BND

## (undated) DEVICE — 3M™ TEGADERM™ TRANSPARENT FILM DRESSING FRAME STYLE, 1626W, 4 IN X 4-3/4 IN (10 CM X 12 CM), 50/CT 4CT/CASE: Brand: 3M™ TEGADERM™

## (undated) DEVICE — HEMOSTASIS VALVE PHD SM BORE WTH SPRNG METAL INSRT TOOL TRQU

## (undated) DEVICE — ELECTRODE TRAIN EDGE SYS W/ QUIK-COMBO CONN

## (undated) DEVICE — GLOVE ORANGE PI 7   MSG9070

## (undated) DEVICE — CATHETER GUID 6FR DIA0.071IN SHFT NYL STD JL 4 CRV ENH VIS

## (undated) DEVICE — CATHETER GUID 6FR L100CM DIA0.071IN NYL SHFT EBU3.5

## (undated) DEVICE — DEVICE INFLATION ANGIO 30ATM

## (undated) DEVICE — KIT MED IMAG CNTRST AGNT W/ IOPAMIDOL REUSE

## (undated) DEVICE — KIT ANGIO W/ AT P65 PREM HND CTRL FOR CNTRST DEL ANGIOTOUCH

## (undated) DEVICE — CATHETER DIAG AD 5FR L110CM 145DEG COR GRY HYDRPHLC NYL

## (undated) DEVICE — GLIDESHEATH SLENDER STAINLESS STEEL KIT: Brand: GLIDESHEATH SLENDER

## (undated) DEVICE — CATHETER GUID 6FR L100CM DIA0.071IN NYL SHFT EBU3.0 W/O

## (undated) DEVICE — PACK PROCEDURE SURG SURG CARDIAC CATH

## (undated) DEVICE — DRAPE SURG BRACH STRL

## (undated) DEVICE — PML 12 ADULT FLL-MLL PG: Brand: NAMIC

## (undated) DEVICE — RADIFOCUS OPTITORQUE ANGIOGRAPHIC CATHETER: Brand: OPTITORQUE

## (undated) DEVICE — Device: Brand: OMNIWIRE PRESSURE GUIDE WIRE

## (undated) DEVICE — SHEET,DRAPE,53X77,STERILE: Brand: MEDLINE

## (undated) DEVICE — CATH BLLN ANGIO 2.50X15MM SC EUPHORA RX

## (undated) DEVICE — CONTAINER,SPECIMEN,OR STERILE,4OZ: Brand: MEDLINE

## (undated) DEVICE — HI-TORQUE BALANCE MIDDLEWEIGHT UNIVERSAL GUIDE WIRE .014 J TIP 3.0 CM X 190 CM: Brand: HI-TORQUE BALANCE MIDDLEWEIGHT UNIVERSAL

## (undated) DEVICE — GLOVE ORANGE PI 7 1/2   MSG9075

## (undated) DEVICE — CANNULA NSL AD L7FT CLR VYN CRV PRNG NONFLARED TIP STD CONN